# Patient Record
Sex: MALE | Race: WHITE | Employment: OTHER | RURAL
[De-identification: names, ages, dates, MRNs, and addresses within clinical notes are randomized per-mention and may not be internally consistent; named-entity substitution may affect disease eponyms.]

---

## 2017-03-28 RX ORDER — VARDENAFIL HYDROCHLORIDE 20 MG/1
20 TABLET ORAL PRN
Qty: 20 TABLET | Refills: 3 | Status: SHIPPED | OUTPATIENT
Start: 2017-03-28 | End: 2017-04-04 | Stop reason: SDUPTHER

## 2017-03-28 RX ORDER — VARDENAFIL HYDROCHLORIDE 20 MG/1
20 TABLET ORAL PRN
Qty: 20 TABLET | Refills: 3 | Status: SHIPPED | OUTPATIENT
Start: 2017-03-28 | End: 2017-03-28 | Stop reason: SDUPTHER

## 2017-04-04 RX ORDER — VARDENAFIL HYDROCHLORIDE 20 MG/1
20 TABLET ORAL PRN
Qty: 20 TABLET | Refills: 3 | Status: SHIPPED | OUTPATIENT
Start: 2017-04-04 | End: 2018-04-04

## 2017-05-01 ENCOUNTER — OFFICE VISIT (OUTPATIENT)
Dept: FAMILY MEDICINE CLINIC | Age: 72
End: 2017-05-01
Payer: MEDICARE

## 2017-05-01 ENCOUNTER — TELEPHONE (OUTPATIENT)
Dept: FAMILY MEDICINE CLINIC | Age: 72
End: 2017-05-01

## 2017-05-01 VITALS
DIASTOLIC BLOOD PRESSURE: 94 MMHG | HEIGHT: 71 IN | WEIGHT: 203 LBS | BODY MASS INDEX: 28.42 KG/M2 | SYSTOLIC BLOOD PRESSURE: 164 MMHG

## 2017-05-01 DIAGNOSIS — S93.401A SPRAIN OF RIGHT ANKLE, UNSPECIFIED LIGAMENT, INITIAL ENCOUNTER: Primary | ICD-10-CM

## 2017-05-01 PROCEDURE — 99213 OFFICE O/P EST LOW 20 MIN: CPT | Performed by: NURSE PRACTITIONER

## 2017-05-01 RX ORDER — HYDROCODONE BITARTRATE AND ACETAMINOPHEN 7.5; 325 MG/1; MG/1
1 TABLET ORAL EVERY 8 HOURS PRN
Qty: 30 TABLET | Refills: 0 | Status: SHIPPED | OUTPATIENT
Start: 2017-05-01 | End: 2017-05-05

## 2017-05-05 ENCOUNTER — TELEPHONE (OUTPATIENT)
Dept: FAMILY MEDICINE CLINIC | Age: 72
End: 2017-05-05

## 2017-05-05 DIAGNOSIS — S93.401D SPRAIN OF RIGHT ANKLE, UNSPECIFIED LIGAMENT, SUBSEQUENT ENCOUNTER: Primary | ICD-10-CM

## 2017-05-05 RX ORDER — HYDROCODONE BITARTRATE AND ACETAMINOPHEN 10; 325 MG/1; MG/1
1 TABLET ORAL EVERY 6 HOURS PRN
Qty: 60 TABLET | Refills: 0 | Status: SHIPPED | OUTPATIENT
Start: 2017-05-05 | End: 2017-05-23

## 2017-05-08 DIAGNOSIS — S93.401D SPRAIN OF RIGHT ANKLE, UNSPECIFIED LIGAMENT, SUBSEQUENT ENCOUNTER: ICD-10-CM

## 2017-05-12 ENCOUNTER — TRANSCRIBE ORDERS (OUTPATIENT)
Dept: ADMINISTRATIVE | Facility: HOSPITAL | Age: 72
End: 2017-05-12

## 2017-05-12 ENCOUNTER — APPOINTMENT (OUTPATIENT)
Dept: LAB | Facility: HOSPITAL | Age: 72
End: 2017-05-12

## 2017-05-12 DIAGNOSIS — M79.661 PAIN IN RIGHT LOWER LEG: ICD-10-CM

## 2017-05-12 DIAGNOSIS — M79.671 PAIN IN RIGHT FOOT: ICD-10-CM

## 2017-05-12 DIAGNOSIS — S93.411A SPRAIN OF CALCANEOFIBULAR LIGAMENT OF RIGHT ANKLE, INITIAL ENCOUNTER: ICD-10-CM

## 2017-05-12 DIAGNOSIS — M77.9: Primary | ICD-10-CM

## 2017-05-12 LAB
ALBUMIN SERPL-MCNC: 4.1 G/DL (ref 3.5–5)
ALBUMIN/GLOB SERPL: 1.1 G/DL (ref 1.1–2.5)
ALP SERPL-CCNC: 110 U/L (ref 24–120)
ALT SERPL W P-5'-P-CCNC: 25 U/L (ref 0–54)
ANION GAP SERPL CALCULATED.3IONS-SCNC: 13 MMOL/L (ref 4–13)
AST SERPL-CCNC: 27 U/L (ref 7–45)
BASOPHILS # BLD AUTO: 0.06 10*3/MM3 (ref 0–0.2)
BASOPHILS NFR BLD AUTO: 1.2 % (ref 0–2)
BILIRUB SERPL-MCNC: 0.4 MG/DL (ref 0.1–1)
BUN BLD-MCNC: 18 MG/DL (ref 5–21)
BUN/CREAT SERPL: 15.4 (ref 7–25)
CALCIUM SPEC-SCNC: 9.7 MG/DL (ref 8.4–10.4)
CHLORIDE SERPL-SCNC: 102 MMOL/L (ref 98–110)
CO2 SERPL-SCNC: 26 MMOL/L (ref 24–31)
CREAT BLD-MCNC: 1.17 MG/DL (ref 0.5–1.4)
CRP SERPL-MCNC: 2.34 MG/DL (ref 0–0.99)
DEPRECATED RDW RBC AUTO: 40.5 FL (ref 40–54)
EOSINOPHIL # BLD AUTO: 0.04 10*3/MM3 (ref 0–0.7)
EOSINOPHIL NFR BLD AUTO: 0.8 % (ref 0–4)
ERYTHROCYTE [DISTWIDTH] IN BLOOD BY AUTOMATED COUNT: 12.4 % (ref 12–15)
ERYTHROCYTE [SEDIMENTATION RATE] IN BLOOD: 24 MM/HR (ref 0–15)
GFR SERPL CREATININE-BSD FRML MDRD: 62 ML/MIN/1.73
GLOBULIN UR ELPH-MCNC: 3.9 GM/DL
GLUCOSE BLD-MCNC: 100 MG/DL (ref 70–100)
HCT VFR BLD AUTO: 37.7 % (ref 40–52)
HGB BLD-MCNC: 13.3 G/DL (ref 14–18)
IMM GRANULOCYTES # BLD: 0.02 10*3/MM3 (ref 0–0.03)
IMM GRANULOCYTES NFR BLD: 0.4 % (ref 0–5)
LYMPHOCYTES # BLD AUTO: 1.03 10*3/MM3 (ref 0.72–4.86)
LYMPHOCYTES NFR BLD AUTO: 20.4 % (ref 15–45)
MCH RBC QN AUTO: 31.7 PG (ref 28–32)
MCHC RBC AUTO-ENTMCNC: 35.3 G/DL (ref 33–36)
MCV RBC AUTO: 90 FL (ref 82–95)
MONOCYTES # BLD AUTO: 0.96 10*3/MM3 (ref 0.19–1.3)
MONOCYTES NFR BLD AUTO: 19 % (ref 4–12)
NEUTROPHILS # BLD AUTO: 2.93 10*3/MM3 (ref 1.87–8.4)
NEUTROPHILS NFR BLD AUTO: 58.2 % (ref 39–78)
PLATELET # BLD AUTO: 305 10*3/MM3 (ref 130–400)
PMV BLD AUTO: 10.6 FL (ref 6–12)
POTASSIUM BLD-SCNC: 3.9 MMOL/L (ref 3.5–5.3)
PROT SERPL-MCNC: 8 G/DL (ref 6.3–8.7)
RBC # BLD AUTO: 4.19 10*6/MM3 (ref 4.8–5.9)
SODIUM BLD-SCNC: 141 MMOL/L (ref 135–145)
URATE SERPL-MCNC: 8.1 MG/DL (ref 3.5–8.5)
WBC NRBC COR # BLD: 5.04 10*3/MM3 (ref 4.8–10.8)

## 2017-05-12 PROCEDURE — 36415 COLL VENOUS BLD VENIPUNCTURE: CPT | Performed by: NURSE PRACTITIONER

## 2017-05-12 PROCEDURE — 84550 ASSAY OF BLOOD/URIC ACID: CPT | Performed by: NURSE PRACTITIONER

## 2017-05-12 PROCEDURE — 80053 COMPREHEN METABOLIC PANEL: CPT | Performed by: NURSE PRACTITIONER

## 2017-05-12 PROCEDURE — 85651 RBC SED RATE NONAUTOMATED: CPT | Performed by: NURSE PRACTITIONER

## 2017-05-12 PROCEDURE — 86140 C-REACTIVE PROTEIN: CPT | Performed by: NURSE PRACTITIONER

## 2017-05-12 PROCEDURE — 85025 COMPLETE CBC W/AUTO DIFF WBC: CPT | Performed by: NURSE PRACTITIONER

## 2017-05-23 ENCOUNTER — OFFICE VISIT (OUTPATIENT)
Dept: FAMILY MEDICINE CLINIC | Age: 72
End: 2017-05-23
Payer: MEDICARE

## 2017-05-23 VITALS
HEIGHT: 71 IN | WEIGHT: 200 LBS | SYSTOLIC BLOOD PRESSURE: 132 MMHG | DIASTOLIC BLOOD PRESSURE: 84 MMHG | BODY MASS INDEX: 28 KG/M2

## 2017-05-23 DIAGNOSIS — Z23 NEED FOR PNEUMOCOCCAL VACCINATION: ICD-10-CM

## 2017-05-23 DIAGNOSIS — Z00.00 ROUTINE GENERAL MEDICAL EXAMINATION AT A HEALTH CARE FACILITY: Primary | ICD-10-CM

## 2017-05-23 DIAGNOSIS — R73.9 ELEVATED BLOOD SUGAR: ICD-10-CM

## 2017-05-23 DIAGNOSIS — N52.9 ERECTILE DYSFUNCTION, UNSPECIFIED ERECTILE DYSFUNCTION TYPE: ICD-10-CM

## 2017-05-23 DIAGNOSIS — F41.9 ANXIETY: ICD-10-CM

## 2017-05-23 DIAGNOSIS — Z12.5 SCREENING PSA (PROSTATE SPECIFIC ANTIGEN): ICD-10-CM

## 2017-05-23 DIAGNOSIS — E78.00 ELEVATED CHOLESTEROL: ICD-10-CM

## 2017-05-23 LAB
ALBUMIN SERPL-MCNC: 4.4 G/DL (ref 3.5–5.2)
ALP BLD-CCNC: 75 U/L (ref 40–130)
ALT SERPL-CCNC: 27 U/L (ref 5–41)
ANION GAP SERPL CALCULATED.3IONS-SCNC: 16 MMOL/L (ref 7–19)
AST SERPL-CCNC: 18 U/L (ref 5–40)
BILIRUB SERPL-MCNC: 0.3 MG/DL (ref 0.2–1.2)
BUN BLDV-MCNC: 15 MG/DL (ref 8–23)
CALCIUM SERPL-MCNC: 9.6 MG/DL (ref 8.8–10.2)
CHLORIDE BLD-SCNC: 104 MMOL/L (ref 98–111)
CHOLESTEROL, TOTAL: 226 MG/DL (ref 160–199)
CO2: 24 MMOL/L (ref 22–29)
CREAT SERPL-MCNC: 1 MG/DL (ref 0.5–1.2)
GFR NON-AFRICAN AMERICAN: >60
GLUCOSE BLD-MCNC: 102 MG/DL (ref 74–109)
HBA1C MFR BLD: 6.4 %
HCT VFR BLD CALC: 43 % (ref 42–52)
HDLC SERPL-MCNC: 56 MG/DL (ref 55–121)
HEMOGLOBIN: 13.9 G/DL (ref 14–18)
LDL CHOLESTEROL CALCULATED: 154 MG/DL
MCH RBC QN AUTO: 31 PG (ref 27–31)
MCHC RBC AUTO-ENTMCNC: 32.3 G/DL (ref 33–37)
MCV RBC AUTO: 96 FL (ref 80–94)
PDW BLD-RTO: 12.9 % (ref 11.5–14.5)
PLATELET # BLD: 238 K/UL (ref 130–400)
PMV BLD AUTO: 11.3 FL (ref 7.4–10.4)
POTASSIUM SERPL-SCNC: 5.1 MMOL/L (ref 3.5–5)
PROSTATE SPECIFIC ANTIGEN: 1.61 NG/ML (ref 0–4)
RBC # BLD: 4.48 M/UL (ref 4.7–6.1)
SODIUM BLD-SCNC: 144 MMOL/L (ref 136–145)
TOTAL PROTEIN: 7.6 G/DL (ref 6.6–8.7)
TRIGL SERPL-MCNC: 81 MG/DL (ref 150–199)
WBC # BLD: 9.9 K/UL (ref 4.8–10.8)

## 2017-05-23 PROCEDURE — 36415 COLL VENOUS BLD VENIPUNCTURE: CPT | Performed by: NURSE PRACTITIONER

## 2017-05-23 PROCEDURE — G0438 PPPS, INITIAL VISIT: HCPCS | Performed by: NURSE PRACTITIONER

## 2017-05-23 PROCEDURE — G0009 ADMIN PNEUMOCOCCAL VACCINE: HCPCS | Performed by: NURSE PRACTITIONER

## 2017-05-23 PROCEDURE — 99214 OFFICE O/P EST MOD 30 MIN: CPT | Performed by: NURSE PRACTITIONER

## 2017-05-23 PROCEDURE — 90732 PPSV23 VACC 2 YRS+ SUBQ/IM: CPT | Performed by: NURSE PRACTITIONER

## 2017-05-23 RX ORDER — ALPRAZOLAM 0.5 MG/1
0.5 TABLET ORAL 3 TIMES DAILY PRN
Qty: 60 TABLET | Refills: 0 | Status: SHIPPED | OUTPATIENT
Start: 2017-05-23 | End: 2017-07-12 | Stop reason: SDUPTHER

## 2017-05-23 ASSESSMENT — PATIENT HEALTH QUESTIONNAIRE - PHQ9: SUM OF ALL RESPONSES TO PHQ QUESTIONS 1-9: 0

## 2017-05-23 ASSESSMENT — ANXIETY QUESTIONNAIRES: GAD7 TOTAL SCORE: 0

## 2017-05-23 ASSESSMENT — LIFESTYLE VARIABLES
AUDIT-C TOTAL SCORE: 5
AUDIT TOTAL SCORE: 5
HOW OFTEN DURING THE LAST YEAR HAVE YOU FAILED TO DO WHAT WAS NORMALLY EXPECTED FROM YOU BECAUSE OF DRINKING: 0
HAS A RELATIVE, FRIEND, DOCTOR, OR ANOTHER HEALTH PROFESSIONAL EXPRESSED CONCERN ABOUT YOUR DRINKING OR SUGGESTED YOU CUT DOWN: 0
HOW OFTEN DURING THE LAST YEAR HAVE YOU HAD A FEELING OF GUILT OR REMORSE AFTER DRINKING: 0
HAVE YOU OR SOMEONE ELSE BEEN INJURED AS A RESULT OF YOUR DRINKING: 0
HOW MANY STANDARD DRINKS CONTAINING ALCOHOL DO YOU HAVE ON A TYPICAL DAY: 0
HOW OFTEN DO YOU HAVE A DRINK CONTAINING ALCOHOL: 3
HOW OFTEN DURING THE LAST YEAR HAVE YOU BEEN UNABLE TO REMEMBER WHAT HAPPENED THE NIGHT BEFORE BECAUSE YOU HAD BEEN DRINKING: 0
HOW OFTEN DURING THE LAST YEAR HAVE YOU FOUND THAT YOU WERE NOT ABLE TO STOP DRINKING ONCE YOU HAD STARTED: 0
HOW OFTEN DURING THE LAST YEAR HAVE YOU NEEDED AN ALCOHOLIC DRINK FIRST THING IN THE MORNING TO GET YOURSELF GOING AFTER A NIGHT OF HEAVY DRINKING: 0
HOW OFTEN DO YOU HAVE SIX OR MORE DRINKS ON ONE OCCASION: 2

## 2017-05-23 ASSESSMENT — ENCOUNTER SYMPTOMS
NAUSEA: 0
GASTROINTESTINAL NEGATIVE: 1
ABDOMINAL PAIN: 0
BACK PAIN: 0
COLOR CHANGE: 0
RESPIRATORY NEGATIVE: 1
RHINORRHEA: 0
SHORTNESS OF BREATH: 0
DIARRHEA: 0

## 2017-07-12 ENCOUNTER — TELEPHONE (OUTPATIENT)
Dept: FAMILY MEDICINE CLINIC | Age: 72
End: 2017-07-12

## 2017-07-12 DIAGNOSIS — F41.9 ANXIETY: ICD-10-CM

## 2017-07-12 RX ORDER — ALPRAZOLAM 0.5 MG/1
0.5 TABLET ORAL 3 TIMES DAILY PRN
Qty: 60 TABLET | Refills: 0 | Status: SHIPPED | OUTPATIENT
Start: 2017-07-12

## 2017-08-08 ENCOUNTER — TELEPHONE (OUTPATIENT)
Dept: FAMILY MEDICINE CLINIC | Age: 72
End: 2017-08-08

## 2017-08-08 RX ORDER — DICLOFENAC SODIUM 75 MG/1
75 TABLET, DELAYED RELEASE ORAL 2 TIMES DAILY
Qty: 60 TABLET | Refills: 5 | Status: SHIPPED | OUTPATIENT
Start: 2017-08-08

## 2017-08-08 RX ORDER — ALLOPURINOL 300 MG/1
300 TABLET ORAL DAILY
Qty: 30 TABLET | Refills: 5 | Status: SHIPPED | OUTPATIENT
Start: 2017-08-08

## 2023-04-18 ENCOUNTER — HOSPITAL ENCOUNTER (OUTPATIENT)
Dept: WOUND CARE | Age: 78
Discharge: HOME | End: 2023-04-18
Payer: MEDICARE

## 2023-04-18 VITALS
RESPIRATION RATE: 20 BRPM | TEMPERATURE: 97.5 F | SYSTOLIC BLOOD PRESSURE: 195 MMHG | HEART RATE: 69 BPM | DIASTOLIC BLOOD PRESSURE: 84 MMHG | HEIGHT: 71 IN | BODY MASS INDEX: 27.3 KG/M2 | WEIGHT: 195 LBS

## 2023-04-18 DIAGNOSIS — L97.922 SKIN ULCER OF LEFT LOWER LEG WITH FAT LAYER EXPOSED (HCC): Primary | ICD-10-CM

## 2023-04-18 DIAGNOSIS — I73.9 PVD (PERIPHERAL VASCULAR DISEASE) (HCC): ICD-10-CM

## 2023-04-18 PROCEDURE — 97598 DBRDMT OPN WND ADDL 20CM/<: CPT | Performed by: NURSE PRACTITIONER

## 2023-04-18 PROCEDURE — 97598 DBRDMT OPN WND ADDL 20CM/<: CPT

## 2023-04-18 PROCEDURE — 99205 OFFICE O/P NEW HI 60 MIN: CPT | Performed by: NURSE PRACTITIONER

## 2023-04-18 PROCEDURE — 97597 DBRDMT OPN WND 1ST 20 CM/<: CPT | Performed by: NURSE PRACTITIONER

## 2023-04-18 PROCEDURE — 99204 OFFICE O/P NEW MOD 45 MIN: CPT

## 2023-04-18 PROCEDURE — 97597 DBRDMT OPN WND 1ST 20 CM/<: CPT

## 2023-04-18 RX ORDER — BACITRACIN, NEOMYCIN, POLYMYXIN B 400; 3.5; 5 [USP'U]/G; MG/G; [USP'U]/G
OINTMENT TOPICAL ONCE
OUTPATIENT
Start: 2023-04-18 | End: 2023-04-18

## 2023-04-18 RX ORDER — LIDOCAINE HYDROCHLORIDE 20 MG/ML
JELLY TOPICAL ONCE
Status: DISCONTINUED | OUTPATIENT
Start: 2023-04-18 | End: 2023-04-20 | Stop reason: HOSPADM

## 2023-04-18 RX ORDER — LIDOCAINE 50 MG/G
OINTMENT TOPICAL ONCE
OUTPATIENT
Start: 2023-04-18 | End: 2023-04-18

## 2023-04-18 RX ORDER — BETAMETHASONE DIPROPIONATE 0.05 %
OINTMENT (GRAM) TOPICAL ONCE
OUTPATIENT
Start: 2023-04-18 | End: 2023-04-18

## 2023-04-18 RX ORDER — CLOBETASOL PROPIONATE 0.5 MG/G
OINTMENT TOPICAL ONCE
OUTPATIENT
Start: 2023-04-18 | End: 2023-04-18

## 2023-04-18 RX ORDER — CETIRIZINE HYDROCHLORIDE 10 MG/1
10 TABLET ORAL DAILY
COMMUNITY

## 2023-04-18 RX ORDER — BACITRACIN ZINC AND POLYMYXIN B SULFATE 500; 1000 [USP'U]/G; [USP'U]/G
OINTMENT TOPICAL ONCE
OUTPATIENT
Start: 2023-04-18 | End: 2023-04-18

## 2023-04-18 RX ORDER — LISINOPRIL 10 MG/1
10 TABLET ORAL DAILY
COMMUNITY

## 2023-04-18 RX ORDER — LIDOCAINE HYDROCHLORIDE 40 MG/ML
SOLUTION TOPICAL ONCE
OUTPATIENT
Start: 2023-04-18 | End: 2023-04-18

## 2023-04-18 RX ORDER — LIDOCAINE 40 MG/G
CREAM TOPICAL ONCE
OUTPATIENT
Start: 2023-04-18 | End: 2023-04-18

## 2023-04-18 RX ORDER — GINSENG 100 MG
CAPSULE ORAL ONCE
OUTPATIENT
Start: 2023-04-18 | End: 2023-04-18

## 2023-04-18 RX ORDER — LIDOCAINE HYDROCHLORIDE 20 MG/ML
JELLY TOPICAL ONCE
OUTPATIENT
Start: 2023-04-18 | End: 2023-04-18

## 2023-04-18 RX ORDER — GENTAMICIN SULFATE 1 MG/G
OINTMENT TOPICAL ONCE
OUTPATIENT
Start: 2023-04-18 | End: 2023-04-18

## 2023-04-18 ASSESSMENT — VISUAL ACUITY: OU: 1

## 2023-04-18 NOTE — PROGRESS NOTES
Laterality Date    COLONOSCOPY  01/01/2004    int. hemorrhoids,     HIP ARTHROPLASTY Right     VASCULAR SURGERY      left leg     History reviewed. No pertinent family history. Social History     Tobacco Use    Smoking status: Never    Smokeless tobacco: Never   Substance Use Topics    Alcohol use: Yes         Review of Systems    Review of Systems   Skin:  Positive for wound. All other systems reviewed and are negative. All other review of systems are negative. Physical Exam    BP (!) 195/84   Pulse 69   Temp 97.5 °F (36.4 °C) (Temporal)   Resp 20   Ht 5' 11\" (1.803 m)   Wt 195 lb (88.5 kg)   BMI 27.20 kg/m²     Physical Exam  Vitals reviewed. Constitutional:       Appearance: Normal appearance. He is normal weight. HENT:      Head: Normocephalic and atraumatic. Right Ear: External ear normal.      Left Ear: External ear normal.   Eyes:      General: Lids are normal. Lids are everted, no foreign bodies appreciated. Vision grossly intact. Gaze aligned appropriately. Cardiovascular:      Rate and Rhythm: Normal rate and regular rhythm. Pulses: Normal pulses. Heart sounds: Normal heart sounds. Pulmonary:      Effort: Pulmonary effort is normal.      Breath sounds: Normal breath sounds. Abdominal:      General: Bowel sounds are normal.   Musculoskeletal:         General: Normal range of motion. Skin:     General: Skin is warm and dry. Capillary Refill: Capillary refill takes 2 to 3 seconds. Findings: Wound present. Neurological:      Mental Status: He is alert and oriented to person, place, and time. Psychiatric:         Mood and Affect: Mood normal.         Behavior: Behavior normal.         Thought Content: Thought content normal.         Judgment: Judgment normal.           Post Debridement Measurements and Assessment:    The patientspain is   . Wound is has improved.     Please refer to nursing measurements and assessment regarding wound pre and

## 2023-04-18 NOTE — PLAN OF CARE
[place] Application   Below Knee    NAME:  [facility] OF BIRTH:  1945  MEDICAL RECORD NUMBER:  [id number]  DATE:  4/18/2023    Tianna zambranoot: Applied moisturizing agent to dry skin as needed. Appied primary and secondary dressing as ordered. Applied Unna roll from toes to knee overlapping each time. Applied ace wrap or coban from toes to below the knee. Instructed patient/caregiver to keep dressing dry and intact. DO NOT REMOVE DRESSING. Instructed pt/family/caregiver to report excessive draining, loose bandage, wet dressing, severe pain or tingling in toes. Applied [place] dressing below the knee to left lower leg. Unna Boot(s) were applied per  Guidelines.      Electronically signed by Nils Majano RN on 4/18/2023 at 11:04 AM

## 2023-04-18 NOTE — DISCHARGE INSTRUCTIONS
[address] [facility]   Physician Orders and Discharge Instructions  [address]  Telephone: [phone]    NAME:  Jillian Burden OF BIRTH:  1945  MEDICAL RECORD NUMBER:  [id number]  DATE:  4/18/2023    Discharge condition: Stable    Discharge to: Home    Left via:Private automobile    Accompanied by:  self    ECF/HHA: none    Dressing Orders:  Left leg Wound:   Wash with soap and water. Apply restore AG to wound bed then apply Calamine Coflex Unnaboot from toes to knee. Change once weekly . Compression Therapy is an important part of your program of care. Compression makes it easier for your body to pump the blood from your legs back to your heart, helping alleviate a condition called chronic venous insufficiency. Chronic venous insufficiency is an underlying cause of your injury, and managing it properly will help you to heal.      Keep the bandage in place at all times unless you experience foot pain or numbness or coolness of the toes. Do not attempt to remove and reapply the bandage system. Give it time. Your bandage may feel tight at first. This is normal. Your bandage will become more comfortable as swelling goes down. Be active, as suggested by your healthcare provider. Daily walks or mild exercise encourages better blood flow to aid healing. Put your feet up when sitting for long periods of time. Keep the bandage dry. Wet compression bandages are more likely to slip down and/or cause damage to good skin. PRECAUTIONS For your safety, compression therapy should be used under the supervision of a healthcare professional. Contact your care provider if you experience any of the following:  Pain that does not go away with elevation. Discoloration or numbness of the toes. The bandage slipping out of place  Fluid leaking through the bandage.     Treatment Orders:  Protein rich diet

## 2023-04-21 ENCOUNTER — HOSPITAL ENCOUNTER (OUTPATIENT)
Dept: WOUND CARE | Age: 78
Discharge: HOME | End: 2023-04-21
Payer: MEDICARE

## 2023-04-21 PROCEDURE — 29580 STRAPPING UNNA BOOT: CPT

## 2023-04-21 NOTE — PLAN OF CARE
Problem: Discharge Planning  Goal: Discharge to home or other facility with appropriate resources  Outcome: Progressing     Problem: Wound:  Goal: Will show signs of wound healing; wound closure and no evidence of infection  Description: Will show signs of wound healing; wound closure and no evidence of infection  Outcome: Progressing     Problem: Venous:  Goal: Signs of wound healing will improve  Description: Signs of wound healing will improve  Outcome: Progressing     Problem: Compression therapy:  Goal: Will be free from complications associated with compression therapy  Description: Will be free from complications associated with compression therapy  Outcome: Progressing

## 2023-04-21 NOTE — DISCHARGE INSTRUCTIONS
Orders:  Protein rich diet (unless restricted by your physician); Multivitamin daily; Elevate legs above the level of your heart when sitting 3-4 times daily for at least one hour each time, avoid standing for long periods of time. [facility] follow up visit ___Monday__________________________  (Please note your next appointment above and if you are unable to keep, kindly give a 24 hour notice. Thank you.)          If you experience any of the following, please call the "[employer] during business hours:    * Increase in Pain  * Temperature over 101  * Increase in drainage from your wound  * Drainage with a foul odor  * Bleeding  * Increase in swelling  * Need for compression bandage changes due to slippage, breakthrough drainage. If you need medical attention outside of the business hours of the "[employer] please contact your PCP or go to the nearest emergency room.

## 2023-04-21 NOTE — PLAN OF CARE
[place] Application   Below Knee    NAME:  Puja [facility] OF BIRTH:  1945  MEDICAL RECORD NUMBER:  [id number]  DATE:  4/21/2023    Morgan Greene boot: Applied moisturizing agent to dry skin as needed. Appied primary and secondary dressing as ordered. Applied Unna roll from toes to knee overlapping each time. Applied ace wrap or coban from toes to below the knee. Instructed patient/caregiver to keep dressing dry and intact. DO NOT REMOVE DRESSING. Instructed pt/family/caregiver to report excessive draining, loose bandage, wet dressing, severe pain or tingling in toes. Applied [place] dressing below the knee to left lower leg. Unna Boot(s) were applied per  Guidelines.      Electronically signed by Brett Peguero RN on 4/21/2023 at 11:43 AM

## 2023-04-24 ENCOUNTER — HOSPITAL ENCOUNTER (OUTPATIENT)
Dept: WOUND CARE | Age: 78
Discharge: HOME | End: 2023-04-24
Payer: MEDICARE

## 2023-04-24 VITALS
BODY MASS INDEX: 27.3 KG/M2 | WEIGHT: 195 LBS | HEART RATE: 69 BPM | HEIGHT: 71 IN | RESPIRATION RATE: 20 BRPM | DIASTOLIC BLOOD PRESSURE: 84 MMHG | TEMPERATURE: 97.5 F | SYSTOLIC BLOOD PRESSURE: 150 MMHG

## 2023-04-24 DIAGNOSIS — I73.9 PVD (PERIPHERAL VASCULAR DISEASE) (HCC): ICD-10-CM

## 2023-04-24 DIAGNOSIS — L97.922 SKIN ULCER OF LEFT LOWER LEG WITH FAT LAYER EXPOSED (HCC): Primary | ICD-10-CM

## 2023-04-24 PROCEDURE — 99212 OFFICE O/P EST SF 10 MIN: CPT | Performed by: NURSE PRACTITIONER

## 2023-04-24 PROCEDURE — 29580 STRAPPING UNNA BOOT: CPT

## 2023-04-24 NOTE — PROGRESS NOTES
[address]   Progress Note and Procedure Note      [address] [place] RECORD NUMBER:  [id number]  AGE: 69 y.o. GENDER: male  : 1945  EPISODE DATE:  2023    Subjective:     Chief Complaint   Patient presents with    Wound Check     Left leg wound         HISTORY of PRESENT ILLNESS HPI     Yogesh Fernandez is a 69 y.o. male who presents today for wound/ulcer evaluation. History of Wound Context: left leg wounds follow up/eval and treat    Ulcer Identification:  Ulcer Type: venous  Contributing Factors: edema    Wound: N/A        PAST MEDICAL HISTORY        Diagnosis Date    Hepatitis A     Hyperlipidemia     mild    Hypertension     Panic attacks        PAST SURGICAL HISTORY    Past Surgical History:   Procedure Laterality Date    COLONOSCOPY  2004    int. hemorrhoids,     HIP ARTHROPLASTY Right     VASCULAR SURGERY      left leg       FAMILY HISTORY    History reviewed. No pertinent family history.     SOCIAL HISTORY    Social History     Tobacco Use    Smoking status: Never    Smokeless tobacco: Never   Substance Use Topics    Alcohol use: Yes    Drug use: No       ALLERGIES    Allergies   Allergen Reactions    Norco [Hydrocodone-Acetaminophen] Other (See Comments)     insomnia    Tetracyclines & Related        MEDICATIONS    Current Outpatient Medications on File Prior to Encounter   Medication Sig Dispense Refill    cetirizine (ZYRTEC) 10 MG tablet Take 1 tablet by mouth daily      lisinopril (PRINIVIL;ZESTRIL) 10 MG tablet Take 1 tablet by mouth daily      allopurinol (ZYLOPRIM) 300 MG tablet Take 1 tablet by mouth daily 30 tablet 5    diclofenac (VOLTAREN) 75 MG EC tablet Take 1 tablet by mouth 2 times daily 60 tablet 5    ALPRAZolam (XANAX) 0.5 MG tablet Take 1 tablet by mouth 3 times daily as needed for Anxiety 60 tablet 0    vardenafil (LEVITRA) 20 MG tablet Take 1 tablet by mouth as needed for Erectile Dysfunction (take 30mins to one hour before sex) 21

## 2023-04-24 NOTE — PLAN OF CARE
[place] Application   Below Knee    NAME:  Puja [facility] OF BIRTH:  1945  MEDICAL RECORD NUMBER:  [id number]  DATE:  4/24/2023    Ogema Gum boot: Applied moisturizing agent to dry skin as needed. Appied primary and secondary dressing as ordered. Applied Unna roll from toes to knee overlapping each time. Applied ace wrap or coban from toes to below the knee. Instructed patient/caregiver to keep dressing dry and intact. DO NOT REMOVE DRESSING. Instructed pt/family/caregiver to report excessive draining, loose bandage, wet dressing, severe pain or tingling in toes. Applied [place] dressing below the knee to left lower leg. Unna Boot(s) were applied per  Guidelines.      Electronically signed by Chilango Lam RN on 4/24/2023 at 9:25 AM

## 2023-04-27 ENCOUNTER — HOSPITAL ENCOUNTER (OUTPATIENT)
Dept: WOUND CARE | Age: 78
Discharge: HOME | End: 2023-04-27
Payer: MEDICARE

## 2023-04-27 VITALS
DIASTOLIC BLOOD PRESSURE: 81 MMHG | SYSTOLIC BLOOD PRESSURE: 158 MMHG | TEMPERATURE: 98 F | RESPIRATION RATE: 18 BRPM | HEART RATE: 66 BPM

## 2023-04-27 DIAGNOSIS — I73.9 PVD (PERIPHERAL VASCULAR DISEASE) (HCC): ICD-10-CM

## 2023-04-27 DIAGNOSIS — L97.922 SKIN ULCER OF LEFT LOWER LEG WITH FAT LAYER EXPOSED (HCC): Primary | ICD-10-CM

## 2023-04-27 PROCEDURE — 29580 STRAPPING UNNA BOOT: CPT

## 2023-04-27 RX ORDER — BETAMETHASONE DIPROPIONATE 0.05 %
OINTMENT (GRAM) TOPICAL ONCE
OUTPATIENT
Start: 2023-04-27 | End: 2023-04-27

## 2023-04-27 RX ORDER — BACITRACIN, NEOMYCIN, POLYMYXIN B 400; 3.5; 5 [USP'U]/G; MG/G; [USP'U]/G
OINTMENT TOPICAL ONCE
OUTPATIENT
Start: 2023-04-27 | End: 2023-04-27

## 2023-04-27 RX ORDER — LIDOCAINE HYDROCHLORIDE 20 MG/ML
JELLY TOPICAL ONCE
OUTPATIENT
Start: 2023-04-27 | End: 2023-04-27

## 2023-04-27 RX ORDER — GENTAMICIN SULFATE 1 MG/G
OINTMENT TOPICAL ONCE
OUTPATIENT
Start: 2023-04-27 | End: 2023-04-27

## 2023-04-27 RX ORDER — LIDOCAINE HYDROCHLORIDE 40 MG/ML
SOLUTION TOPICAL ONCE
OUTPATIENT
Start: 2023-04-27 | End: 2023-04-27

## 2023-04-27 RX ORDER — LIDOCAINE 50 MG/G
OINTMENT TOPICAL ONCE
OUTPATIENT
Start: 2023-04-27 | End: 2023-04-27

## 2023-04-27 RX ORDER — LIDOCAINE 40 MG/G
CREAM TOPICAL ONCE
OUTPATIENT
Start: 2023-04-27 | End: 2023-04-27

## 2023-04-27 RX ORDER — CLOBETASOL PROPIONATE 0.5 MG/G
OINTMENT TOPICAL ONCE
OUTPATIENT
Start: 2023-04-27 | End: 2023-04-27

## 2023-04-27 RX ORDER — GINSENG 100 MG
CAPSULE ORAL ONCE
OUTPATIENT
Start: 2023-04-27 | End: 2023-04-27

## 2023-04-27 RX ORDER — BACITRACIN ZINC AND POLYMYXIN B SULFATE 500; 1000 [USP'U]/G; [USP'U]/G
OINTMENT TOPICAL ONCE
OUTPATIENT
Start: 2023-04-27 | End: 2023-04-27

## 2023-04-27 NOTE — DISCHARGE INSTRUCTIONS
Orders:  Protein rich diet (unless restricted by your physician); Multivitamin daily; Elevate legs above the level of your heart when sitting 3-4 times daily for at least one hour each time, avoid standing for long periods of time. [address] [facility] follow up visit ___1 week with Kasia__________________________  (Please note your next appointment above and if you are unable to keep, kindly give a 24 hour notice. Thank you.)          If you experience any of the following, please call the [facility] during business hours:    * Increase in Pain  * Temperature over 101  * Increase in drainage from your wound  * Drainage with a foul odor  * Bleeding  * Increase in swelling  * Need for compression bandage changes due to slippage, breakthrough drainage. If you need medical attention outside of the business hours of the [facility] please contact your PCP or go to the nearest emergency room.

## 2023-04-27 NOTE — PROGRESS NOTES
[place] Application   Below Knee    NAME:  Puja [facility] OF BIRTH:  1945  MEDICAL RECORD NUMBER:  [id number]  DATE:  4/27/2023    Marita Goodrich boot: Applied moisturizing agent to dry skin as needed. Appied primary and secondary dressing as ordered. Applied Unna roll from toes to knee overlapping each time. Applied ace wrap or coban from toes to below the knee. Instructed patient/caregiver to keep dressing dry and intact. DO NOT REMOVE DRESSING. Instructed pt/family/caregiver to report excessive draining, loose bandage, wet dressing, severe pain or tingling in toes. Applied [place] dressing below the knee to left lower leg. Unna Boot(s) were applied per  Guidelines.      Electronically signed by Kiah Lenz RN on 4/27/2023 at 10:57 AM

## 2023-05-01 ENCOUNTER — HOSPITAL ENCOUNTER (OUTPATIENT)
Dept: WOUND CARE | Age: 78
Discharge: HOME | End: 2023-05-01
Payer: MEDICARE

## 2023-05-01 VITALS
SYSTOLIC BLOOD PRESSURE: 158 MMHG | DIASTOLIC BLOOD PRESSURE: 81 MMHG | RESPIRATION RATE: 18 BRPM | HEART RATE: 66 BPM | TEMPERATURE: 98 F | HEIGHT: 71 IN | BODY MASS INDEX: 27.3 KG/M2 | WEIGHT: 195 LBS

## 2023-05-01 DIAGNOSIS — I73.9 PVD (PERIPHERAL VASCULAR DISEASE) (HCC): ICD-10-CM

## 2023-05-01 DIAGNOSIS — L97.922 SKIN ULCER OF LEFT LOWER LEG WITH FAT LAYER EXPOSED (HCC): Primary | ICD-10-CM

## 2023-05-01 PROCEDURE — 29580 STRAPPING UNNA BOOT: CPT

## 2023-05-01 PROCEDURE — 99212 OFFICE O/P EST SF 10 MIN: CPT | Performed by: NURSE PRACTITIONER

## 2023-05-01 NOTE — PLAN OF CARE
[place] Application   Below Knee    NAME:  [address] [facility] OF BIRTH:  1945  MEDICAL RECORD NUMBER:  [id number]  DATE:  5/1/2023    Dick Ch boot: Applied moisturizing agent to dry skin as needed. Appied primary and secondary dressing as ordered. Applied Unna roll from toes to knee overlapping each time. Applied ace wrap or coban from toes to below the knee. Instructed patient/caregiver to keep dressing dry and intact. DO NOT REMOVE DRESSING. Instructed pt/family/caregiver to report excessive draining, loose bandage, wet dressing, severe pain or tingling in toes. Applied [place] dressing below the knee to left lower leg. Unna Boot(s) were applied per  Guidelines.      Electronically signed by Bernardo Nunez RN on 5/1/2023 at 10:34 AM

## 2023-05-01 NOTE — PROGRESS NOTES
[facility] [address]   Progress Note and Procedure Note      [address] [place] RECORD NUMBER:  [id number]  AGE: 69 y.o. GENDER: male  : 1945  EPISODE DATE:  2023    Subjective:     Chief Complaint   Patient presents with    Wound Check     Left leg wound         HISTORY of PRESENT ILLNESS KATHRYN Alarcon is a 69 y.o. male who presents today for wound/ulcer evaluation. History of Wound Context: left leg wound follow up/eval and treat    Ulcer Identification:  Ulcer Type: venous  Contributing Factors: edema    Wound: N/A        PAST MEDICAL HISTORY        Diagnosis Date    Hepatitis A     Hyperlipidemia     mild    Hypertension     Panic attacks        PAST SURGICAL HISTORY    Past Surgical History:   Procedure Laterality Date    COLONOSCOPY  2004    int. hemorrhoids,     HIP ARTHROPLASTY Right     VASCULAR SURGERY      left leg       FAMILY HISTORY    History reviewed. No pertinent family history.     SOCIAL HISTORY    Social History     Tobacco Use    Smoking status: Never    Smokeless tobacco: Never   Substance Use Topics    Alcohol use: Yes    Drug use: No       ALLERGIES    Allergies   Allergen Reactions    Norco [Hydrocodone-Acetaminophen] Other (See Comments)     insomnia    Tetracyclines & Related        MEDICATIONS    Current Outpatient Medications on File Prior to Encounter   Medication Sig Dispense Refill    cetirizine (ZYRTEC) 10 MG tablet Take 1 tablet by mouth daily      lisinopril (PRINIVIL;ZESTRIL) 10 MG tablet Take 1 tablet by mouth daily      allopurinol (ZYLOPRIM) 300 MG tablet Take 1 tablet by mouth daily 30 tablet 5    diclofenac (VOLTAREN) 75 MG EC tablet Take 1 tablet by mouth 2 times daily 60 tablet 5    ALPRAZolam (XANAX) 0.5 MG tablet Take 1 tablet by mouth 3 times daily as needed for Anxiety 60 tablet 0    vardenafil (LEVITRA) 20 MG tablet Take 1 tablet by mouth as needed for Erectile Dysfunction (take 30mins to one hour before sex) 21

## 2023-05-04 ENCOUNTER — HOSPITAL ENCOUNTER (OUTPATIENT)
Dept: WOUND CARE | Age: 78
Discharge: HOME | End: 2023-05-04
Payer: MEDICARE

## 2023-05-04 VITALS
RESPIRATION RATE: 20 BRPM | DIASTOLIC BLOOD PRESSURE: 86 MMHG | WEIGHT: 195 LBS | HEART RATE: 69 BPM | BODY MASS INDEX: 27.3 KG/M2 | TEMPERATURE: 98.1 F | HEIGHT: 71 IN | SYSTOLIC BLOOD PRESSURE: 205 MMHG

## 2023-05-04 PROCEDURE — 29580 STRAPPING UNNA BOOT: CPT

## 2023-05-04 NOTE — DISCHARGE INSTRUCTIONS
[address] [facility]   Physician Orders and Discharge Instructions  [address]  Telephone: [phone]    NAME:  Ruby Austin OF BIRTH:  1945  MEDICAL RECORD NUMBER:  [id number]  DATE:  5/4/2023    Discharge condition: Stable    Discharge to: Home    Left via:Private automobile    Accompanied by:  self    ECF/HHA: none     Dressing Orders:   Left leg Wound:   Wash with soap and water. Apply hydrofera blue saline moistened to wound bed and wrap with Calamine Coflex Unnaboot from toes to knee and OPTILOCK outside of calamine layer. Change every 3 days       Compression Therapy is an important part of your program of care. Compression makes it easier for your body to pump the blood from your legs back to your heart, helping alleviate a condition called chronic venous insufficiency. Chronic venous insufficiency is an underlying cause of your injury, and managing it properly will help you to heal.       · Keep the bandage in place at all times unless you experience foot pain or numbness or coolness of the toes. Do not attempt to remove and reapply the bandage system. · Give it time. Your bandage may feel tight at first. This is normal. Your bandage will become more comfortable as swelling goes down. · Be active, as suggested by your healthcare provider. Daily walks or mild exercise encourages better blood flow to aid healing. · Put your feet up when sitting for long periods of time. · Keep the bandage dry. Wet compression bandages are more likely to slip down and/or cause damage to good skin. PRECAUTIONS For your safety, compression therapy should be used under the supervision of a healthcare professional. Contact your care provider if you experience any of the following:   · Pain that does not go away with elevation. · Discoloration or numbness of the toes.    · The bandage slipping

## 2023-05-04 NOTE — PLAN OF CARE
[place] Application   Below Knee    NAME:  Marina Mcfarland  YOB: 1945  MEDICAL RECORD NUMBER:  [id number]  DATE:  5/4/2023    Caesar Buckle boot: Applied moisturizing agent to dry skin as needed. Appied primary and secondary dressing as ordered. Applied Unna roll from toes to knee overlapping each time. Applied ace wrap or coban from toes to below the knee. Instructed patient/caregiver to keep dressing dry and intact. DO NOT REMOVE DRESSING. Instructed pt/family/caregiver to report excessive draining, loose bandage, wet dressing, severe pain or tingling in toes. Applied [place] dressing below the knee to left lower leg. Unna Boot(s) were applied per  Guidelines.      Electronically signed by Lucero Cotton RN on 5/4/2023 at 10:08 AM

## 2023-05-08 ENCOUNTER — HOSPITAL ENCOUNTER (OUTPATIENT)
Dept: WOUND CARE | Age: 78
Discharge: HOME | End: 2023-05-08
Payer: MEDICARE

## 2023-05-08 VITALS
RESPIRATION RATE: 18 BRPM | TEMPERATURE: 98 F | HEART RATE: 63 BPM | DIASTOLIC BLOOD PRESSURE: 73 MMHG | HEIGHT: 71 IN | BODY MASS INDEX: 27.3 KG/M2 | SYSTOLIC BLOOD PRESSURE: 158 MMHG | WEIGHT: 195 LBS

## 2023-05-08 DIAGNOSIS — L97.922 SKIN ULCER OF LEFT LOWER LEG WITH FAT LAYER EXPOSED (HCC): Primary | ICD-10-CM

## 2023-05-08 DIAGNOSIS — I73.9 PVD (PERIPHERAL VASCULAR DISEASE) (HCC): ICD-10-CM

## 2023-05-08 PROCEDURE — 97597 DBRDMT OPN WND 1ST 20 CM/<: CPT

## 2023-05-08 PROCEDURE — 97597 DBRDMT OPN WND 1ST 20 CM/<: CPT | Performed by: NURSE PRACTITIONER

## 2023-05-08 PROCEDURE — 6370000000 HC RX 637 (ALT 250 FOR IP): Performed by: NURSE PRACTITIONER

## 2023-05-08 RX ORDER — BACITRACIN ZINC AND POLYMYXIN B SULFATE 500; 1000 [USP'U]/G; [USP'U]/G
OINTMENT TOPICAL ONCE
OUTPATIENT
Start: 2023-05-08 | End: 2023-05-08

## 2023-05-08 RX ORDER — LIDOCAINE 50 MG/G
OINTMENT TOPICAL ONCE
OUTPATIENT
Start: 2023-05-08 | End: 2023-05-08

## 2023-05-08 RX ORDER — LIDOCAINE HYDROCHLORIDE 20 MG/ML
JELLY TOPICAL ONCE
OUTPATIENT
Start: 2023-05-08 | End: 2023-05-08

## 2023-05-08 RX ORDER — LIDOCAINE 40 MG/G
CREAM TOPICAL ONCE
OUTPATIENT
Start: 2023-05-08 | End: 2023-05-08

## 2023-05-08 RX ORDER — LIDOCAINE HYDROCHLORIDE 40 MG/ML
SOLUTION TOPICAL ONCE
OUTPATIENT
Start: 2023-05-08 | End: 2023-05-08

## 2023-05-08 RX ORDER — BACITRACIN, NEOMYCIN, POLYMYXIN B 400; 3.5; 5 [USP'U]/G; MG/G; [USP'U]/G
OINTMENT TOPICAL ONCE
OUTPATIENT
Start: 2023-05-08 | End: 2023-05-08

## 2023-05-08 RX ORDER — GINSENG 100 MG
CAPSULE ORAL ONCE
OUTPATIENT
Start: 2023-05-08 | End: 2023-05-08

## 2023-05-08 RX ORDER — LIDOCAINE HYDROCHLORIDE 20 MG/ML
JELLY TOPICAL ONCE
Status: COMPLETED | OUTPATIENT
Start: 2023-05-08 | End: 2023-05-08

## 2023-05-08 RX ORDER — BETAMETHASONE DIPROPIONATE 0.05 %
OINTMENT (GRAM) TOPICAL ONCE
OUTPATIENT
Start: 2023-05-08 | End: 2023-05-08

## 2023-05-08 RX ORDER — M-VIT,TX,IRON,MINS/CALC/FOLIC 27MG-0.4MG
1 TABLET ORAL DAILY
COMMUNITY

## 2023-05-08 RX ORDER — GENTAMICIN SULFATE 1 MG/G
OINTMENT TOPICAL ONCE
OUTPATIENT
Start: 2023-05-08 | End: 2023-05-08

## 2023-05-08 RX ORDER — CLOBETASOL PROPIONATE 0.5 MG/G
OINTMENT TOPICAL ONCE
OUTPATIENT
Start: 2023-05-08 | End: 2023-05-08

## 2023-05-08 RX ADMIN — LIDOCAINE HYDROCHLORIDE: 20 JELLY TOPICAL at 10:28

## 2023-05-08 NOTE — PROGRESS NOTES
[address]   Progress Note and Procedure Note      [address] [place] RECORD NUMBER:  [id number]  AGE: 69 y.o. GENDER: male  : 1945  EPISODE DATE:  2023    Subjective:     Chief Complaint   Patient presents with    Wound Check      HISTORY of PRESENT ILLNESS HPI     Marcie Dandy is a 69 y.o. male who presents today for wound/ulcer evaluation. History of Wound Context: left leg wound follow up/eval and treat    Ulcer Identification:  Ulcer Type: venous  Contributing Factors: venous stasis    Wound: N/A        PAST MEDICAL HISTORY        Diagnosis Date    Hepatitis A 1970    Hyperlipidemia     mild    Hypertension     Panic attacks        PAST SURGICAL HISTORY    Past Surgical History:   Procedure Laterality Date    COLONOSCOPY  2004    int. hemorrhoids,     HIP ARTHROPLASTY Right     VASCULAR SURGERY      left leg       FAMILY HISTORY    No family history on file.     SOCIAL HISTORY    Social History     Tobacco Use    Smoking status: Never    Smokeless tobacco: Never   Substance Use Topics    Alcohol use: Yes    Drug use: No       ALLERGIES    Allergies   Allergen Reactions    Norco [Hydrocodone-Acetaminophen] Other (See Comments)     insomnia    Tetracyclines & Related        MEDICATIONS    Current Outpatient Medications on File Prior to Encounter   Medication Sig Dispense Refill    Multiple Vitamins-Minerals (THERAPEUTIC MULTIVITAMIN-MINERALS) tablet Take 1 tablet by mouth daily      cetirizine (ZYRTEC) 10 MG tablet Take 1 tablet by mouth daily      lisinopril (PRINIVIL;ZESTRIL) 10 MG tablet Take 1 tablet by mouth daily      allopurinol (ZYLOPRIM) 300 MG tablet Take 1 tablet by mouth daily 30 tablet 5    diclofenac (VOLTAREN) 75 MG EC tablet Take 1 tablet by mouth 2 times daily 60 tablet 5    ALPRAZolam (XANAX) 0.5 MG tablet Take 1 tablet by mouth 3 times daily as needed for Anxiety 60 tablet 0    vardenafil (LEVITRA) 20 MG tablet Take 1 tablet by

## 2023-05-08 NOTE — DISCHARGE INSTRUCTIONS
[address] [facility]   Physician Orders and Discharge Instructions  [address]  Telephone: [phone]    NAME:  Horacio Mota OF BIRTH:  1945  MEDICAL RECORD NUMBER:  [id number]  DATE:  5/8/2023    Discharge condition: Stable    Discharge to: Home    Left via:Private automobile    Accompanied by:  self    ECF/HHA: none    Dressing Orders:  Left leg Wound:   Wash with soap and water. Apply Aqua gabrielle AG to wound bed then apply Calamine Coflex Unnaboot from toes to knee. Change once weekly . Compression Therapy is an important part of your program of care. Compression makes it easier for your body to pump the blood from your legs back to your heart, helping alleviate a condition called chronic venous insufficiency. Chronic venous insufficiency is an underlying cause of your injury, and managing it properly will help you to heal.      Keep the bandage in place at all times unless you experience foot pain or numbness or coolness of the toes. Do not attempt to remove and reapply the bandage system. Give it time. Your bandage may feel tight at first. This is normal. Your bandage will become more comfortable as swelling goes down. Be active, as suggested by your healthcare provider. Daily walks or mild exercise encourages better blood flow to aid healing. Put your feet up when sitting for long periods of time. Keep the bandage dry. Wet compression bandages are more likely to slip down and/or cause damage to good skin. PRECAUTIONS For your safety, compression therapy should be used under the supervision of a healthcare professional. Contact your care provider if you experience any of the following:  Pain that does not go away with elevation. Discoloration or numbness of the toes. The bandage slipping out of place  Fluid leaking through the bandage.     Treatment Orders:  Protein rich diet

## 2023-05-08 NOTE — PLAN OF CARE
[place] Application   Below Knee    NAME:  Puja [facility] OF BIRTH:  1945  MEDICAL RECORD NUMBER:  [id number]  DATE:  5/8/2023    Wily Coca boot: Applied moisturizing agent to dry skin as needed. Appied primary and secondary dressing as ordered. Applied Unna roll from toes to knee overlapping each time. Applied ace wrap or coban from toes to below the knee. Instructed patient/caregiver to keep dressing dry and intact. DO NOT REMOVE DRESSING. Instructed pt/family/caregiver to report excessive draining, loose bandage, wet dressing, severe pain or tingling in toes. Applied [place] dressing below the knee to left lower leg. Unna Boot(s) were applied per  Guidelines.      Electronically signed by Mary Lou Nichols RN on 5/8/2023 at 10:20 AM

## 2023-05-12 ENCOUNTER — HOSPITAL ENCOUNTER (OUTPATIENT)
Dept: WOUND CARE | Age: 78
Discharge: HOME | End: 2023-05-12
Payer: MEDICARE

## 2023-05-12 VITALS
SYSTOLIC BLOOD PRESSURE: 157 MMHG | RESPIRATION RATE: 18 BRPM | HEART RATE: 56 BPM | TEMPERATURE: 98.4 F | DIASTOLIC BLOOD PRESSURE: 75 MMHG

## 2023-05-12 DIAGNOSIS — L97.922 SKIN ULCER OF LEFT LOWER LEG WITH FAT LAYER EXPOSED (HCC): Primary | ICD-10-CM

## 2023-05-12 DIAGNOSIS — I73.9 PVD (PERIPHERAL VASCULAR DISEASE) (HCC): ICD-10-CM

## 2023-05-12 PROCEDURE — 29580 STRAPPING UNNA BOOT: CPT

## 2023-05-12 RX ORDER — LIDOCAINE 50 MG/G
OINTMENT TOPICAL ONCE
OUTPATIENT
Start: 2023-05-12 | End: 2023-05-12

## 2023-05-12 RX ORDER — LIDOCAINE HYDROCHLORIDE 20 MG/ML
JELLY TOPICAL ONCE
OUTPATIENT
Start: 2023-05-12 | End: 2023-05-12

## 2023-05-12 RX ORDER — GENTAMICIN SULFATE 1 MG/G
OINTMENT TOPICAL ONCE
OUTPATIENT
Start: 2023-05-12 | End: 2023-05-12

## 2023-05-12 RX ORDER — BETAMETHASONE DIPROPIONATE 0.05 %
OINTMENT (GRAM) TOPICAL ONCE
OUTPATIENT
Start: 2023-05-12 | End: 2023-05-12

## 2023-05-12 RX ORDER — GINSENG 100 MG
CAPSULE ORAL ONCE
OUTPATIENT
Start: 2023-05-12 | End: 2023-05-12

## 2023-05-12 RX ORDER — CLOBETASOL PROPIONATE 0.5 MG/G
OINTMENT TOPICAL ONCE
OUTPATIENT
Start: 2023-05-12 | End: 2023-05-12

## 2023-05-12 RX ORDER — LIDOCAINE 40 MG/G
CREAM TOPICAL ONCE
OUTPATIENT
Start: 2023-05-12 | End: 2023-05-12

## 2023-05-12 RX ORDER — LIDOCAINE HYDROCHLORIDE 40 MG/ML
SOLUTION TOPICAL ONCE
OUTPATIENT
Start: 2023-05-12 | End: 2023-05-12

## 2023-05-12 RX ORDER — BACITRACIN, NEOMYCIN, POLYMYXIN B 400; 3.5; 5 [USP'U]/G; MG/G; [USP'U]/G
OINTMENT TOPICAL ONCE
OUTPATIENT
Start: 2023-05-12 | End: 2023-05-12

## 2023-05-12 RX ORDER — BACITRACIN ZINC AND POLYMYXIN B SULFATE 500; 1000 [USP'U]/G; [USP'U]/G
OINTMENT TOPICAL ONCE
OUTPATIENT
Start: 2023-05-12 | End: 2023-05-12

## 2023-05-12 NOTE — DISCHARGE INSTRUCTIONS
[address] [facility]   Physician Orders and Discharge Instructions  [address]  Telephone: [phone]    NAME:  Ernesto Joe OF BIRTH:  1945  MEDICAL RECORD NUMBER:  [id number]  DATE:  5/12/2023    Discharge condition: Stable    Discharge to: Home    Left via:Private automobile    Accompanied by:  self    ECF/HHA:     Dressing Orders:  Left leg Wound:   Wash with soap and water. Apply Aqua gabrielle AG to wound bed then apply Calamine Coflex Unnaboot from toes to knee. Change once weekly . Compression Therapy is an important part of your program of care. Compression makes it easier for your body to pump the blood from your legs back to your heart, helping alleviate a condition called chronic venous insufficiency. Chronic venous insufficiency is an underlying cause of your injury, and managing it properly will help you to heal.        Keep the bandage in place at all times unless you experience foot pain or numbness or coolness of the toes. Do not attempt to remove and reapply the bandage system. Give it time. Your bandage may feel tight at first. This is normal. Your bandage will become more comfortable as swelling goes down. Be active, as suggested by your healthcare provider. Daily walks or mild exercise encourages better blood flow to aid healing. Put your feet up when sitting for long periods of time. Keep the bandage dry. Wet compression bandages are more likely to slip down and/or cause damage to good skin. PRECAUTIONS For your safety, compression therapy should be used under the supervision of a healthcare professional. Contact your care provider if you experience any of the following:  Pain that does not go away with elevation. Discoloration or numbness of the toes. The bandage slipping out of place  Fluid leaking through the bandage.      Treatment Orders:  Protein rich

## 2023-05-12 NOTE — PROGRESS NOTES
[place] Application   Below Knee    NAME:  Puja [facility] OF BIRTH:  1945  MEDICAL RECORD NUMBER:  [id number]  DATE:  5/12/2023    Juan F cota: Applied moisturizing agent to dry skin as needed. Appied primary and secondary dressing as ordered. Applied Unna roll from toes to knee overlapping each time. Applied ace wrap or coban from toes to below the knee. Instructed patient/caregiver to keep dressing dry and intact. DO NOT REMOVE DRESSING. Instructed pt/family/caregiver to report excessive draining, loose bandage, wet dressing, severe pain or tingling in toes. Applied [place] dressing below the knee to left lower leg. Unna Boot(s) were applied per  Guidelines.      Electronically signed by Ajit Francis RN on 5/12/2023 at 9:26 AM

## 2023-05-15 ENCOUNTER — HOSPITAL ENCOUNTER (OUTPATIENT)
Dept: WOUND CARE | Age: 78
Discharge: HOME | End: 2023-05-15
Payer: MEDICARE

## 2023-05-15 VITALS
RESPIRATION RATE: 18 BRPM | DIASTOLIC BLOOD PRESSURE: 75 MMHG | WEIGHT: 195 LBS | HEART RATE: 56 BPM | BODY MASS INDEX: 27.3 KG/M2 | SYSTOLIC BLOOD PRESSURE: 150 MMHG | HEIGHT: 71 IN | TEMPERATURE: 98.4 F

## 2023-05-15 DIAGNOSIS — L97.922 SKIN ULCER OF LEFT LOWER LEG WITH FAT LAYER EXPOSED (HCC): Primary | ICD-10-CM

## 2023-05-15 DIAGNOSIS — I73.9 PVD (PERIPHERAL VASCULAR DISEASE) (HCC): ICD-10-CM

## 2023-05-15 PROCEDURE — 99213 OFFICE O/P EST LOW 20 MIN: CPT

## 2023-05-15 PROCEDURE — 99212 OFFICE O/P EST SF 10 MIN: CPT | Performed by: NURSE PRACTITIONER

## 2023-05-15 NOTE — DISCHARGE INSTRUCTIONS
[address] and [facility] Oxygen Therapy   Physician Orders and Discharge Instructions  [address]  Telephone: [phone]    NAME:  Andres Yanes OF BIRTH:  1945  MEDICAL RECORD NUMBER:  [id number]  DATE:  5/15/2023    Discharge condition: Stable    Discharge to: Home    Left via:Private automobile    Accompanied by:  self    ECF/HHA: [facility]    Dressing Orders:  Left leg wound: Soap and water wash. Apply a double layer of Xeroform (the yellow sticky dressing) over the wound bed daily. Secure with dry gauze and rolled gauze then secure with coban daily. Treatment Orders:  Protein rich diet (unless restricted by your physician); Multivitamin daily; Elevate legs above the level of your heart when sitting 3-4 times daily for at least one hour each time, avoid standing for long periods of time. [address] [place] [address],[address] follow up visit ____1 week with Kasia_________________________  (Please note your next appointment above and if you are unable to keep, kindly give a 24 hour notice. Thank you.)          If you experience any of the following, please call the [address] during business hours:    * Increase in Pain  * Temperature over 101  * Increase in drainage from your wound  * Drainage with a foul odor  * Bleeding  * Increase in swelling  * Need for compression bandage changes due to slippage, breakthrough drainage. If you need medical attention outside of the business hours of the [address] please contact your PCP or go to the nearest emergency room.

## 2023-05-15 NOTE — HOME CARE
[address]:     [facility] [address] [facility], [address] [place] [address] Road  p: [phone] f: [phone]     Ordering Center:     [address]  [address], JACOB [address] [address] [phone]  WOUND CARE Dept: [address] [facility] [phone]    Patient Information:      [address] [place]ter [address]   [phone]   : 1945  AGE: 69 y.o. GENDER: male   EPISODE DATE: 5/15/2023    Insurance:      PRIMARY INSURANCE:  Plan: Nikolas Howard ESSENTIAL/PLUS  Coverage: BCBS MEDICARE  Effective Date: 2015  Group Number: [unfilled]  Subscriber Number: [id number] - (Medicare Managed)    Payer/Plan Subscr  Sex Relation Sub. Ins. ID Effective Group Num   1.  BCBS MEDICARE* Livan Aguilera 1945 Male Self [id number] 1/1/15 [id number]                                   [address]       Patient Wound Information:      Problem List Items Addressed This Visit          Circulatory    PVD (peripheral vascular disease) ([address]       Other    * (Principal) Skin ulcer of left lower leg with fat layer exposed ([facility] [address] - Primary       WOUNDS REQUIRING DRESSING SUPPLIES:     Wound 23 Pretibial Left wound 1-left leg venous (Active)   Wound Image   05/15/23 1018   Wound Etiology Venous 05/15/23 1018   Dressing Status Old drainage noted;New dressing applied 05/15/23 1018   Wound Cleansed Soap and water 05/15/23 1018   Dressing/Treatment Xeroform 05/15/23 1018   Wound Length (cm) 2.9 cm 05/15/23 1018   Wound Width (cm) 4.8 cm 05/15/23 1018   Wound Depth (cm) 0.1 cm 05/15/23 1018   Wound Surface Area (cm^2) 13.92 cm^2 05/15/23 1018   Change in Wound Size % (l*w) 62.88 05/15/23 1018   Wound Volume (cm^3) 1.392 cm^3 05/15/23 1018   Wound Healing % 63 05/15/23 1018   Post-Procedure Length (cm) 2.9 cm 23 1014   Post-Procedure Width (cm) 4.8 cm 23 1014   Post-Procedure Depth (cm) 0.1

## 2023-05-15 NOTE — PLAN OF CARE
Problem: Discharge Planning  Goal: Discharge to home or other facility with appropriate resources  Outcome: Progressing     Problem: Venous:  Goal: Signs of wound healing will improve  Description: Signs of wound healing will improve  Outcome: Progressing

## 2023-05-15 NOTE — PROGRESS NOTES
[address]   Progress Note and Procedure Note      [address] RECORD NUMBER:  [id number]  AGE: 69 y.o. GENDER: male  : 1945  EPISODE DATE:  5/15/2023    Subjective:     Chief Complaint   Patient presents with    Wound Check     Left leg wound         HISTORY of PRESENT ILLNESS HPI     Barbie Sandoval is a 69 y.o. male who presents today for wound/ulcer evaluation. History of Wound Context: left leg wound follow up/eval and treat    Ulcer Identification:  Ulcer Type: venous  Contributing Factors: none    Wound: N/A        PAST MEDICAL HISTORY        Diagnosis Date    Hepatitis A     Hyperlipidemia     mild    Hypertension     Panic attacks        PAST SURGICAL HISTORY    Past Surgical History:   Procedure Laterality Date    COLONOSCOPY  2004    int. hemorrhoids,     HIP ARTHROPLASTY Right     VASCULAR SURGERY      left leg       FAMILY HISTORY    History reviewed. No pertinent family history.     SOCIAL HISTORY    Social History     Tobacco Use    Smoking status: Never    Smokeless tobacco: Never   Substance Use Topics    Alcohol use: Yes    Drug use: No       ALLERGIES    Allergies   Allergen Reactions    Norco [Hydrocodone-Acetaminophen] Other (See Comments)     insomnia    Tetracyclines & Related        MEDICATIONS    Current Outpatient Medications on File Prior to Encounter   Medication Sig Dispense Refill    Multiple Vitamins-Minerals (THERAPEUTIC MULTIVITAMIN-MINERALS) tablet Take 1 tablet by mouth daily      cetirizine (ZYRTEC) 10 MG tablet Take 1 tablet by mouth daily      lisinopril (PRINIVIL;ZESTRIL) 10 MG tablet Take 1 tablet by mouth daily      allopurinol (ZYLOPRIM) 300 MG tablet Take 1 tablet by mouth daily 30 tablet 5    diclofenac (VOLTAREN) 75 MG EC tablet Take 1 tablet by mouth 2 times daily 60 tablet 5    ALPRAZolam (XANAX) 0.5 MG tablet Take 1 tablet by mouth 3 times daily as needed for Anxiety 60 tablet 0    vardenafil (LEVITRA) 20 MG

## 2023-05-25 ENCOUNTER — HOSPITAL ENCOUNTER (OUTPATIENT)
Dept: WOUND CARE | Age: 78
Discharge: HOME | End: 2023-05-25
Payer: MEDICARE

## 2023-05-25 VITALS
HEART RATE: 75 BPM | DIASTOLIC BLOOD PRESSURE: 83 MMHG | TEMPERATURE: 98.5 F | BODY MASS INDEX: 27.3 KG/M2 | RESPIRATION RATE: 18 BRPM | SYSTOLIC BLOOD PRESSURE: 151 MMHG | WEIGHT: 195 LBS | HEIGHT: 71 IN

## 2023-05-25 DIAGNOSIS — I73.9 PVD (PERIPHERAL VASCULAR DISEASE) (HCC): ICD-10-CM

## 2023-05-25 DIAGNOSIS — L97.922 SKIN ULCER OF LEFT LOWER LEG WITH FAT LAYER EXPOSED (HCC): Primary | ICD-10-CM

## 2023-05-25 PROCEDURE — 97597 DBRDMT OPN WND 1ST 20 CM/<: CPT

## 2023-05-25 RX ORDER — BACITRACIN ZINC AND POLYMYXIN B SULFATE 500; 1000 [USP'U]/G; [USP'U]/G
OINTMENT TOPICAL ONCE
OUTPATIENT
Start: 2023-05-25 | End: 2023-05-25

## 2023-05-25 RX ORDER — CLOBETASOL PROPIONATE 0.5 MG/G
OINTMENT TOPICAL ONCE
OUTPATIENT
Start: 2023-05-25 | End: 2023-05-25

## 2023-05-25 RX ORDER — LIDOCAINE 40 MG/G
CREAM TOPICAL ONCE
OUTPATIENT
Start: 2023-05-25 | End: 2023-05-25

## 2023-05-25 RX ORDER — LIDOCAINE 50 MG/G
OINTMENT TOPICAL ONCE
OUTPATIENT
Start: 2023-05-25 | End: 2023-05-25

## 2023-05-25 RX ORDER — BACITRACIN, NEOMYCIN, POLYMYXIN B 400; 3.5; 5 [USP'U]/G; MG/G; [USP'U]/G
OINTMENT TOPICAL ONCE
OUTPATIENT
Start: 2023-05-25 | End: 2023-05-25

## 2023-05-25 RX ORDER — LIDOCAINE HYDROCHLORIDE 20 MG/ML
JELLY TOPICAL ONCE
OUTPATIENT
Start: 2023-05-25 | End: 2023-05-25

## 2023-05-25 RX ORDER — GENTAMICIN SULFATE 1 MG/G
OINTMENT TOPICAL ONCE
OUTPATIENT
Start: 2023-05-25 | End: 2023-05-25

## 2023-05-25 RX ORDER — LIDOCAINE HYDROCHLORIDE 40 MG/ML
SOLUTION TOPICAL ONCE
OUTPATIENT
Start: 2023-05-25 | End: 2023-05-25

## 2023-05-25 RX ORDER — GINSENG 100 MG
CAPSULE ORAL ONCE
OUTPATIENT
Start: 2023-05-25 | End: 2023-05-25

## 2023-05-25 RX ORDER — BETAMETHASONE DIPROPIONATE 0.05 %
OINTMENT (GRAM) TOPICAL ONCE
OUTPATIENT
Start: 2023-05-25 | End: 2023-05-25

## 2023-05-25 NOTE — DISCHARGE INSTRUCTIONS
[address] [facility] Therapy   Physician Orders and Discharge Instructions  [address]  Telephone: [phone]    NAME:  Crow Casillas OF BIRTH:  1945  MEDICAL RECORD NUMBER:  [id number]  DATE:  5/25/2023    Discharge condition: Stable    Discharge to: Home    Left via:Private automobile    Accompanied by:  self    ECF/HHA: [place]     Dressing Orders:  Left leg wound: Soap and water wash. Apply wound gel to wound bed, then  apply a double layer of Xeroform (the yellow sticky dressing) over the wound bed daily. Secure with dry gauze and rolled gauze then secure with coban daily. Treatment Orders:  Protein rich diet (unless restricted by your physician); Multivitamin daily; Elevate legs above the level of your heart when sitting 3-4 times daily for at least one hour each time, avoid standing for long periods of time. [address] [place] [address],[address] follow up visit ____2 weeks with Kasia_________________________  (Please note your next appointment above and if you are unable to keep, kindly give a 24 hour notice. Thank you.)          If you experience any of the following, please call the [address] during business hours:    * Increase in Pain  * Temperature over 101  * Increase in drainage from your wound  * Drainage with a foul odor  * Bleeding  * Increase in swelling  * Need for compression bandage changes due to slippage, breakthrough drainage. If you need medical attention outside of the business hours of the [address] please contact your PCP or go to the nearest emergency room.

## 2023-05-25 NOTE — PROGRESS NOTES
[address]   Progress Note and Procedure Note      [address] [place] RECORD NUMBER:  [id number]  AGE: 69 y.o. GENDER: male  : 1945  EPISODE DATE:  2023    Subjective:     Chief Complaint   Patient presents with    Wound Check     Patient presents for recheck of left leg wound      HISTORY of PRESENT ILLNESS HPI     Augie Vargas is a 69 y.o. male who presents today for wound/ulcer evaluation. History of Wound Context: left leg wound follow up/eval and treat    Ulcer Identification:  Ulcer Type: traumatic  Contributing Factors: none    Wound: Abrasion        PAST MEDICAL HISTORY        Diagnosis Date    Hepatitis A     Hyperlipidemia     mild    Hypertension     Panic attacks        PAST SURGICAL HISTORY    Past Surgical History:   Procedure Laterality Date    COLONOSCOPY  2004    int. hemorrhoids,     HIP ARTHROPLASTY Right     VASCULAR SURGERY      left leg       FAMILY HISTORY    History reviewed. No pertinent family history.     SOCIAL HISTORY    Social History     Tobacco Use    Smoking status: Never    Smokeless tobacco: Never   Substance Use Topics    Alcohol use: Yes    Drug use: No       ALLERGIES    Allergies   Allergen Reactions    Norco [Hydrocodone-Acetaminophen] Other (See Comments)     insomnia    Tetracyclines & Related        MEDICATIONS    Current Outpatient Medications on File Prior to Encounter   Medication Sig Dispense Refill    Multiple Vitamins-Minerals (THERAPEUTIC MULTIVITAMIN-MINERALS) tablet Take 1 tablet by mouth daily      cetirizine (ZYRTEC) 10 MG tablet Take 1 tablet by mouth daily      lisinopril (PRINIVIL;ZESTRIL) 10 MG tablet Take 1 tablet by mouth daily      allopurinol (ZYLOPRIM) 300 MG tablet Take 1 tablet by mouth daily 30 tablet 5    ALPRAZolam (XANAX) 0.5 MG tablet Take 1 tablet by mouth 3 times daily as needed for Anxiety 60 tablet 0    vardenafil (LEVITRA) 20 MG tablet Take 1 tablet by mouth as needed for

## 2023-06-06 NOTE — DISCHARGE INSTRUCTIONS
[address] [facility] O[facility] Therapy   Physician Orders and Discharge Instructions  [address]  Telephone: [phone]    NAME:  True Guest OF BIRTH:  1945  MEDICAL RECORD NUMBER:  [id number]  DATE:  6/8/23    Discharge condition: Stable    Discharge to: Home    Left via:Private automobile    Accompanied by:  self    ECF/HHA: [place]       Dressing Orders:   Left leg wound: Soap and water wash. Apply a double layer of Xeroform (the yellow sticky dressing) over the wound bed daily. Secure with dry gauze and rolled gauze then secure with Ace wrap 6 inch daily. Treatment Orders:   Protein rich diet (unless restricted by your physician); Multivitamin daily; Elevate legs above the level of your heart when sitting 3-4 times daily for at least one hour each time, avoid standing for long periods of time. [facility] follow up visit _________1 week____________________  (Please note your next appointment above and if you are unable to keep, kindly give a 24 hour notice. Thank you.)          If you experience any of the following, please call the [address] during business hours:    * Increase in Pain  * Temperature over 101  * Increase in drainage from your wound  * Drainage with a foul odor  * Bleeding  * Increase in swelling  * Need for compression bandage changes due to slippage, breakthrough drainage. If you need medical attention outside of the business hours of the [address] please contact your PCP or go to the nearest emergency room.

## 2023-06-08 ENCOUNTER — HOSPITAL ENCOUNTER (OUTPATIENT)
Dept: WOUND CARE | Age: 78
Discharge: HOME | End: 2023-06-08
Payer: MEDICARE

## 2023-06-08 VITALS
SYSTOLIC BLOOD PRESSURE: 147 MMHG | BODY MASS INDEX: 27.3 KG/M2 | WEIGHT: 195 LBS | DIASTOLIC BLOOD PRESSURE: 84 MMHG | RESPIRATION RATE: 18 BRPM | HEIGHT: 71 IN | TEMPERATURE: 98.1 F | HEART RATE: 64 BPM

## 2023-06-08 DIAGNOSIS — L97.922 SKIN ULCER OF LEFT LOWER LEG WITH FAT LAYER EXPOSED (HCC): Primary | ICD-10-CM

## 2023-06-08 DIAGNOSIS — I73.9 PVD (PERIPHERAL VASCULAR DISEASE) (HCC): ICD-10-CM

## 2023-06-08 PROCEDURE — 99212 OFFICE O/P EST SF 10 MIN: CPT | Performed by: NURSE PRACTITIONER

## 2023-06-08 PROCEDURE — 99213 OFFICE O/P EST LOW 20 MIN: CPT

## 2023-06-08 RX ORDER — SULFAMETHOXAZOLE AND TRIMETHOPRIM 800; 160 MG/1; MG/1
1 TABLET ORAL 2 TIMES DAILY
COMMUNITY

## 2023-06-08 NOTE — PROGRESS NOTES
to one hour before sex) 20 tablet 3    PRILOSEC OTC 20 MG tablet take 1 tablet by mouth twice a day 56 tablet 12     No current facility-administered medications on file prior to encounter. REVIEW OF SYSTEMS    Pertinent items are noted in HPI.     Objective:      BP (!) 147/84   Pulse 64   Temp 98.1 °F (36.7 °C) (Temporal)   Resp 18   Ht 5' 11\" (1.803 m)   Wt 195 lb (88.5 kg)   BMI 27.20 kg/m²     Wt Readings from Last 3 Encounters:   06/08/23 195 lb (88.5 kg)   05/25/23 195 lb (88.5 kg)   05/15/23 195 lb (88.5 kg)       PHYSICAL EXAM    General Appearance: alert and oriented to person, place and time, well developed and well- nourished, in no acute distress  Skin: warm and dry, no rash or erythema  Head: normocephalic and atraumatic  Eyes: pupils equal, round, and reactive to light, extraocular eye movements intact, conjunctivae normal  ENT: tympanic membrane, external ear and ear canal normal bilaterally, nose without deformity, nasal mucosa and turbinates normal without polyps  Neck: supple and non-tender without mass, no thyromegaly or thyroid nodules, no cervical lymphadenopathy  Pulmonary/Chest: clear to auscultation bilaterally- no wheezes, rales or rhonchi, normal air movement, no respiratory distress  Extremities: no cyanosis, clubbing or edema  Musculoskeletal: normal range of motion, no joint swelling, deformity or tenderness  Neurologic: reflexes normal and symmetric, no cranial nerve deficit, gait, coordination and speech normal      Assessment:      Patient Active Problem List   Diagnosis Code    Anxiety F41.9    Low back pain M54.50    Erectile dysfunction N52.9    Skin ulcer of left lower leg with fat layer exposed ([address] Y92.504    PVD (peripheral vascular disease) ([address] I73.9       Wound 04/18/23 Pretibial Left wound 1-left leg venous (Active)   Wound Image   06/08/23 0934   Wound Etiology Venous 06/08/23 0934   Dressing Status New dressing applied 06/08/23 0938   Wound Cleansed Soap and

## 2023-07-05 ENCOUNTER — HOSPITAL ENCOUNTER (OUTPATIENT)
Dept: WOUND CARE | Age: 78
Discharge: HOME | End: 2023-07-05
Payer: MEDICARE

## 2023-07-05 VITALS
TEMPERATURE: 98.7 F | DIASTOLIC BLOOD PRESSURE: 77 MMHG | HEART RATE: 67 BPM | WEIGHT: 195 LBS | SYSTOLIC BLOOD PRESSURE: 183 MMHG | BODY MASS INDEX: 27.3 KG/M2 | RESPIRATION RATE: 18 BRPM | HEIGHT: 71 IN

## 2023-07-05 DIAGNOSIS — L97.922 SKIN ULCER OF LEFT LOWER LEG WITH FAT LAYER EXPOSED (HCC): Primary | ICD-10-CM

## 2023-07-05 DIAGNOSIS — I73.9 PVD (PERIPHERAL VASCULAR DISEASE) (HCC): ICD-10-CM

## 2023-07-05 PROCEDURE — 97597 DBRDMT OPN WND 1ST 20 CM/<: CPT

## 2023-07-05 PROCEDURE — 6370000000 HC RX 637 (ALT 250 FOR IP): Performed by: NURSE PRACTITIONER

## 2023-07-05 RX ORDER — LIDOCAINE HYDROCHLORIDE 20 MG/ML
JELLY TOPICAL ONCE
Status: COMPLETED | OUTPATIENT
Start: 2023-07-05 | End: 2023-07-05

## 2023-07-05 RX ORDER — LIDOCAINE 40 MG/G
CREAM TOPICAL ONCE
OUTPATIENT
Start: 2023-07-05 | End: 2023-07-05

## 2023-07-05 RX ORDER — GENTAMICIN SULFATE 1 MG/G
OINTMENT TOPICAL ONCE
OUTPATIENT
Start: 2023-07-05 | End: 2023-07-05

## 2023-07-05 RX ORDER — GINSENG 100 MG
CAPSULE ORAL ONCE
OUTPATIENT
Start: 2023-07-05 | End: 2023-07-05

## 2023-07-05 RX ORDER — CLOBETASOL PROPIONATE 0.5 MG/G
OINTMENT TOPICAL ONCE
OUTPATIENT
Start: 2023-07-05 | End: 2023-07-05

## 2023-07-05 RX ORDER — BACITRACIN ZINC AND POLYMYXIN B SULFATE 500; 1000 [USP'U]/G; [USP'U]/G
OINTMENT TOPICAL ONCE
OUTPATIENT
Start: 2023-07-05 | End: 2023-07-05

## 2023-07-05 RX ORDER — IBUPROFEN 200 MG
TABLET ORAL ONCE
OUTPATIENT
Start: 2023-07-05 | End: 2023-07-05

## 2023-07-05 RX ORDER — LIDOCAINE HYDROCHLORIDE 40 MG/ML
SOLUTION TOPICAL ONCE
OUTPATIENT
Start: 2023-07-05 | End: 2023-07-05

## 2023-07-05 RX ORDER — LIDOCAINE HYDROCHLORIDE 20 MG/ML
JELLY TOPICAL ONCE
OUTPATIENT
Start: 2023-07-05 | End: 2023-07-05

## 2023-07-05 RX ORDER — LIDOCAINE 50 MG/G
OINTMENT TOPICAL ONCE
OUTPATIENT
Start: 2023-07-05 | End: 2023-07-05

## 2023-07-05 RX ORDER — BETAMETHASONE DIPROPIONATE 0.05 %
OINTMENT (GRAM) TOPICAL ONCE
OUTPATIENT
Start: 2023-07-05 | End: 2023-07-05

## 2023-07-05 RX ADMIN — LIDOCAINE HYDROCHLORIDE: 20 JELLY TOPICAL at 15:17

## 2023-07-05 NOTE — PLAN OF CARE
Problem: Discharge Planning  Goal: Discharge to home or other facility with appropriate resources  Outcome: Progressing     Problem: Wound:  Goal: Will show signs of wound healing; wound closure and no evidence of infection  Description: Will show signs of wound healing; wound closure and no evidence of infection  Outcome: Progressing     Problem: Venous:  Goal: Signs of wound healing will improve  Description: Signs of wound healing will improve  Outcome: Progressing     Problem: Weight control:  Goal: Ability to maintain an optimal weight for height and age will be supported  Description: Ability to maintain an optimal weight for height and age will be supported  Outcome: Progressing     Problem: Falls - Risk of:  Goal: Will remain free from falls  Description: Will remain free from falls  Outcome: Progressing

## 2023-07-05 NOTE — DISCHARGE INSTRUCTIONS
[address] and Hyperbaric Oxygen Therapy   Physician Orders and Discharge Instructions  [address] [facility],[address] [place] [address], [address]  Telephone: [phone]    NAME:  Kaiser Edgar OF BIRTH:  1945  MEDICAL RECORD NUMBER:  [id number]  DATE:  7/5/2023    Discharge condition: Stable    Discharge to: Home    Left via:Private automobile    Accompanied by:  self    ECF/HHA: [place]        Dressing Orders:   Left leg wound: Soap and water wash. Apply a double layer of Xeroform (the yellow sticky dressing) over the wound bed daily. Secure with dry gauze and rolled gauze then secure with Ace wrap 6 inch daily. Treatment Orders:   Protein rich diet (unless restricted by your physician); Multivitamin daily; Elevate legs above the level of your heart when sitting 3-4 times daily for at least one hour each time, avoid standing for long periods of time. [facility] follow up visit __________1 week___________________  (Please note your next appointment above and if you are unable to keep, kindly give a 24 hour notice. Thank you.)          If you experience any of the following, please call the [address] [place] [address] during business hours:    * Increase in Pain  * Temperature over 101  * Increase in drainage from your wound  * Drainage with a foul odor  * Bleeding  * Increase in swelling  * Need for compression bandage changes due to slippage, breakthrough drainage. If you need medical attention outside of the business hours of the [address] [place] [address] please contact your PCP or go to the nearest emergency room.

## 2023-07-05 NOTE — PROGRESS NOTES
[address]   Progress Note and Procedure Note      Heike Mckeon RECORD NUMBER:  [id number]  AGE: 69 y.o. GENDER: male  : 1945  EPISODE DATE:  2023    Subjective:     Chief Complaint   Patient presents with    Wound Check     Patient presents today for recheck left leg wounds. HISTORY of PRESENT ILLNESS KATHRYN Dumont is a 69 y.o. male who presents today for wound/ulcer evaluation. History of Wound Context: left leg wound follow up/eval and treat    Ulcer Identification:  Ulcer Type: venous  Contributing Factors: edema    Wound: N/A        PAST MEDICAL HISTORY        Diagnosis Date    Hepatitis A     Hyperlipidemia     mild    Hypertension     Panic attacks        PAST SURGICAL HISTORY    Past Surgical History:   Procedure Laterality Date    COLONOSCOPY  2004    int. hemorrhoids,     HIP ARTHROPLASTY Right     VASCULAR SURGERY      left leg       FAMILY HISTORY    History reviewed. No pertinent family history.     SOCIAL HISTORY    Social History     Tobacco Use    Smoking status: Never    Smokeless tobacco: Never   Substance Use Topics    Alcohol use: Yes    Drug use: No       ALLERGIES    Allergies   Allergen Reactions    Norco [Hydrocodone-Acetaminophen] Other (See Comments)     insomnia    Tetracyclines & Related        MEDICATIONS    Current Outpatient Medications on File Prior to Encounter   Medication Sig Dispense Refill    sulfamethoxazole-trimethoprim (BACTRIM DS;SEPTRA DS) 800-160 MG per tablet Take 1 tablet by mouth 2 times daily Started on 23      Multiple Vitamins-Minerals (THERAPEUTIC MULTIVITAMIN-MINERALS) tablet Take 1 tablet by mouth daily      cetirizine (ZYRTEC) 10 MG tablet Take 1 tablet by mouth daily      lisinopril (PRINIVIL;ZESTRIL) 10 MG tablet Take 1 tablet by mouth daily      allopurinol (ZYLOPRIM) 300 MG tablet Take 1 tablet by mouth daily 30 tablet 5    ALPRAZolam (XANAX) 0.5 MG tablet Take 1 tablet

## 2023-07-13 ENCOUNTER — HOSPITAL ENCOUNTER (OUTPATIENT)
Dept: WOUND CARE | Age: 78
Discharge: HOME | End: 2023-07-13
Payer: MEDICARE

## 2023-07-13 VITALS
HEIGHT: 71 IN | DIASTOLIC BLOOD PRESSURE: 64 MMHG | HEART RATE: 69 BPM | SYSTOLIC BLOOD PRESSURE: 143 MMHG | TEMPERATURE: 98 F | BODY MASS INDEX: 27.3 KG/M2 | WEIGHT: 195 LBS | RESPIRATION RATE: 18 BRPM

## 2023-07-13 DIAGNOSIS — L97.922 SKIN ULCER OF LEFT LOWER LEG WITH FAT LAYER EXPOSED (HCC): Primary | ICD-10-CM

## 2023-07-13 DIAGNOSIS — I73.9 PVD (PERIPHERAL VASCULAR DISEASE) (HCC): ICD-10-CM

## 2023-07-13 PROCEDURE — 97597 DBRDMT OPN WND 1ST 20 CM/<: CPT

## 2023-07-13 PROCEDURE — 6370000000 HC RX 637 (ALT 250 FOR IP): Performed by: NURSE PRACTITIONER

## 2023-07-13 RX ORDER — LIDOCAINE HYDROCHLORIDE 20 MG/ML
JELLY TOPICAL ONCE
Status: COMPLETED | OUTPATIENT
Start: 2023-07-13 | End: 2023-07-13

## 2023-07-13 RX ORDER — BACITRACIN ZINC AND POLYMYXIN B SULFATE 500; 1000 [USP'U]/G; [USP'U]/G
OINTMENT TOPICAL ONCE
OUTPATIENT
Start: 2023-07-13 | End: 2023-07-13

## 2023-07-13 RX ORDER — LIDOCAINE HYDROCHLORIDE 20 MG/ML
JELLY TOPICAL ONCE
OUTPATIENT
Start: 2023-07-13 | End: 2023-07-13

## 2023-07-13 RX ORDER — LIDOCAINE 50 MG/G
OINTMENT TOPICAL ONCE
OUTPATIENT
Start: 2023-07-13 | End: 2023-07-13

## 2023-07-13 RX ORDER — LIDOCAINE 40 MG/G
CREAM TOPICAL ONCE
OUTPATIENT
Start: 2023-07-13 | End: 2023-07-13

## 2023-07-13 RX ORDER — GINSENG 100 MG
CAPSULE ORAL ONCE
OUTPATIENT
Start: 2023-07-13 | End: 2023-07-13

## 2023-07-13 RX ORDER — BETAMETHASONE DIPROPIONATE 0.05 %
OINTMENT (GRAM) TOPICAL ONCE
OUTPATIENT
Start: 2023-07-13 | End: 2023-07-13

## 2023-07-13 RX ORDER — CLOBETASOL PROPIONATE 0.5 MG/G
OINTMENT TOPICAL ONCE
OUTPATIENT
Start: 2023-07-13 | End: 2023-07-13

## 2023-07-13 RX ORDER — GENTAMICIN SULFATE 1 MG/G
OINTMENT TOPICAL ONCE
OUTPATIENT
Start: 2023-07-13 | End: 2023-07-13

## 2023-07-13 RX ORDER — LIDOCAINE HYDROCHLORIDE 40 MG/ML
SOLUTION TOPICAL ONCE
OUTPATIENT
Start: 2023-07-13 | End: 2023-07-13

## 2023-07-13 RX ORDER — IBUPROFEN 200 MG
TABLET ORAL ONCE
OUTPATIENT
Start: 2023-07-13 | End: 2023-07-13

## 2023-07-13 RX ADMIN — LIDOCAINE HYDROCHLORIDE: 20 JELLY TOPICAL at 09:39

## 2023-07-13 NOTE — DISCHARGE INSTRUCTIONS
[address] and Hyperbaric Oxygen Therapy   Physician Orders and Discharge Instructions  [address] [facility],[address] [place] [address] Blvd, [address]  Telephone: [phone]    NAME:  Janey Dillon OF BIRTH:  1945  MEDICAL RECORD NUMBER:  [id number]  DATE:  7/13/2023    Discharge condition: Stable    Discharge to: Home    Left via:Private automobile    Accompanied by:  self    ECF/HHA: [facility]        Dressing Orders:   Left leg wound medial and anterior: Soap and water wash. Apply a double layer Vaseline gauze (4 layres) over the wound bed daily. Secure with dry gauze and rolled gauze then secure with Ace wrap 6 inch daily. Treatment Orders:   Protein rich diet (unless restricted by your physician); Multivitamin daily; Elevate legs above the level of your heart when sitting 3-4 times daily for at least one hour each time, avoid standing for long periods of time. [facility] follow up visit __________1 week___________________  (Please note your next appointment above and if you are unable to keep, kindly give a 24 hour notice. Thank you.)          If you experience any of the following, please call the [address] [place], [place] [address]i [place] during business hours:    * Increase in Pain  * Temperature over 101  * Increase in drainage from your wound  * Drainage with a foul odor  * Bleeding  * Increase in swelling  * Need for compression bandage changes due to slippage, breakthrough drainage. If you need medical attention outside of the business hours of the [address] [place] [address] please contact your PCP or go to the nearest emergency room.

## 2023-07-13 NOTE — PROGRESS NOTES
[address]   Progress Note and Procedure Note      Heike Mckeon RECORD NUMBER:  [id number]  AGE: 69 y.o. GENDER: male  : 1945  EPISODE DATE:  2023    Subjective:     Chief Complaint   Patient presents with    Wound Check     Patient presents for recheck of left leg wound      HISTORY of PRESENT ILLNESS HPI     Alex Meza is a 69 y.o. male who presents today for wound/ulcer evaluation. History of Wound Context: left leg wound follow up/eval and treat    Ulcer Identification:  Ulcer Type: venous  Contributing Factors: edema    Wound: N/A        PAST MEDICAL HISTORY        Diagnosis Date    Hepatitis A     Hyperlipidemia     mild    Hypertension     Panic attacks        PAST SURGICAL HISTORY    Past Surgical History:   Procedure Laterality Date    COLONOSCOPY  2004    int. hemorrhoids,     HIP ARTHROPLASTY Right     VASCULAR SURGERY      left leg       FAMILY HISTORY    History reviewed. No pertinent family history.     SOCIAL HISTORY    Social History     Tobacco Use    Smoking status: Never    Smokeless tobacco: Never   Substance Use Topics    Alcohol use: Yes    Drug use: No       ALLERGIES    Allergies   Allergen Reactions    Norco [Hydrocodone-Acetaminophen] Other (See Comments)     insomnia    Tetracyclines & Related        MEDICATIONS    Current Outpatient Medications on File Prior to Encounter   Medication Sig Dispense Refill    sulfamethoxazole-trimethoprim (BACTRIM DS;SEPTRA DS) 800-160 MG per tablet Take 1 tablet by mouth 2 times daily Started on 23      Multiple Vitamins-Minerals (THERAPEUTIC MULTIVITAMIN-MINERALS) tablet Take 1 tablet by mouth daily      cetirizine (ZYRTEC) 10 MG tablet Take 1 tablet by mouth daily      lisinopril (PRINIVIL;ZESTRIL) 10 MG tablet Take 1 tablet by mouth daily      allopurinol (ZYLOPRIM) 300 MG tablet Take 1 tablet by mouth daily 30 tablet 5    ALPRAZolam (XANAX) 0.5 MG tablet Take 1 tablet by

## 2023-07-13 NOTE — HOME CARE
[address] [address] [address]:     [facility] 2[address] [facility] 30[address] [facility], [address]  p: [phone]: [phone]     Ordering Center:     [address] [place] [address] [phone]  WOUND CARE Dept: [id number] [place] [address] [phone]    Patient Information:      Fiordaliza Lainez  [address] [facility] [address]   [phone]   : 1945  AGE: 69 y.o. GENDER: male   EPISODE DATE: 2023    Insurance:      PRIMARY INSURANCE:  Plan: [employer] ESSENTIAL/PLUS  Coverage: [employer] MEDICARE  Effective Date: 2015  Group Number: [unfilled]  Subscriber Number: [id number] - (Medicare Managed)    Payer/Plan Subscr  Sex Relation Sub. Ins. ID Effective Group Num   1. BCBS MEDICARE* Fiordaliza Lainez 1945 Male Self [id number] 1/1/15 [id number]                                   [address]       Patient Wound Information:      Problem List Items Addressed This Visit          Circulatory    PVD (peripheral vascular disease) ([address])    Relevant Orders    Initiate Outpatient Wound Care Protocol       Other    * (Principal) Skin ulcer of left lower leg with fat layer exposed ([address]) - Primary    Relevant Orders    Initiate Outpatient Wound Care Protocol       WOUNDS REQUIRING DRESSING SUPPLIES:     Wound 23 Pretibial Left wound 1-left leg venous (Active)   Wound Image   23   Wound Etiology Venous 2339   Dressing Status New dressing applied 23 1006   Wound Cleansed Soap and water 2339   Dressing/Treatment Ace wrap;Gauze dressing/dressing sponge;Petroleum impregnated gauze; Roll gauze 23 1006   Wound Length (cm) 0.6 cm 2339   Wound Width (cm) 0.5 cm 2339   Wound Depth (cm) 0.1 cm 2339   Wound Surface Area (cm^2) 0.3 cm^2 2339   Change in Wound Size % (l*w) 99.2 23 0939   Wound Volume (cm^3) 0.03 cm^3 23

## 2023-07-19 NOTE — DISCHARGE INSTRUCTIONS
bandage. Treatment Orders:   Protein rich diet (unless restricted by your physician); Multivitamin daily; Elevate legs above the level of your heart when sitting 3-4 times daily for at least one hour each time, avoid standing for long periods of time. [facility] follow up visit __________1 week_______________  (Please note your next appointment above and if you are unable to keep, kindly give a 24 hour notice. Thank you.)          If you experience any of the following, please call the [facility] during business hours:    * Increase in Pain  * Temperature over 101  * Increase in drainage from your wound  * Drainage with a foul odor  * Bleeding  * Increase in swelling  * Need for compression bandage changes due to slippage, breakthrough drainage. If you need medical attention outside of the business hours of the [facility] please contact your PCP or go to the nearest emergency room.

## 2023-07-20 ENCOUNTER — HOSPITAL ENCOUNTER (OUTPATIENT)
Dept: WOUND CARE | Age: 78
Discharge: HOME | End: 2023-07-20
Payer: MEDICARE

## 2023-07-20 VITALS
HEIGHT: 71 IN | WEIGHT: 195 LBS | TEMPERATURE: 98.4 F | HEART RATE: 67 BPM | BODY MASS INDEX: 27.3 KG/M2 | RESPIRATION RATE: 18 BRPM | DIASTOLIC BLOOD PRESSURE: 78 MMHG | SYSTOLIC BLOOD PRESSURE: 180 MMHG

## 2023-07-20 DIAGNOSIS — I73.9 PVD (PERIPHERAL VASCULAR DISEASE) (HCC): ICD-10-CM

## 2023-07-20 DIAGNOSIS — L97.922 SKIN ULCER OF LEFT LOWER LEG WITH FAT LAYER EXPOSED (HCC): Primary | ICD-10-CM

## 2023-07-20 PROCEDURE — 29580 STRAPPING UNNA BOOT: CPT

## 2023-07-20 PROCEDURE — 99212 OFFICE O/P EST SF 10 MIN: CPT | Performed by: NURSE PRACTITIONER

## 2023-07-20 PROCEDURE — 6370000000 HC RX 637 (ALT 250 FOR IP): Performed by: NURSE PRACTITIONER

## 2023-07-20 RX ORDER — LIDOCAINE HYDROCHLORIDE 20 MG/ML
JELLY TOPICAL ONCE
OUTPATIENT
Start: 2023-07-20 | End: 2023-07-20

## 2023-07-20 RX ORDER — GENTAMICIN SULFATE 1 MG/G
OINTMENT TOPICAL ONCE
OUTPATIENT
Start: 2023-07-20 | End: 2023-07-20

## 2023-07-20 RX ORDER — GINSENG 100 MG
CAPSULE ORAL ONCE
OUTPATIENT
Start: 2023-07-20 | End: 2023-07-20

## 2023-07-20 RX ORDER — LIDOCAINE HYDROCHLORIDE 40 MG/ML
SOLUTION TOPICAL ONCE
OUTPATIENT
Start: 2023-07-20 | End: 2023-07-20

## 2023-07-20 RX ORDER — LIDOCAINE 50 MG/G
OINTMENT TOPICAL ONCE
OUTPATIENT
Start: 2023-07-20 | End: 2023-07-20

## 2023-07-20 RX ORDER — BACITRACIN ZINC AND POLYMYXIN B SULFATE 500; 1000 [USP'U]/G; [USP'U]/G
OINTMENT TOPICAL ONCE
OUTPATIENT
Start: 2023-07-20 | End: 2023-07-20

## 2023-07-20 RX ORDER — IBUPROFEN 200 MG
TABLET ORAL ONCE
OUTPATIENT
Start: 2023-07-20 | End: 2023-07-20

## 2023-07-20 RX ORDER — CLOBETASOL PROPIONATE 0.5 MG/G
OINTMENT TOPICAL ONCE
OUTPATIENT
Start: 2023-07-20 | End: 2023-07-20

## 2023-07-20 RX ORDER — BETAMETHASONE DIPROPIONATE 0.05 %
OINTMENT (GRAM) TOPICAL ONCE
OUTPATIENT
Start: 2023-07-20 | End: 2023-07-20

## 2023-07-20 RX ORDER — LIDOCAINE HYDROCHLORIDE 20 MG/ML
JELLY TOPICAL ONCE
Status: COMPLETED | OUTPATIENT
Start: 2023-07-20 | End: 2023-07-20

## 2023-07-20 RX ORDER — LIDOCAINE 40 MG/G
CREAM TOPICAL ONCE
OUTPATIENT
Start: 2023-07-20 | End: 2023-07-20

## 2023-07-20 RX ADMIN — LIDOCAINE HYDROCHLORIDE: 20 JELLY TOPICAL at 09:11

## 2023-07-20 NOTE — PLAN OF CARE
[place] Application   Below Knee    NAME:  Alvarado Eric [facility] OF BIRTH:  1945  MEDICAL RECORD NUMBER:  [id number]  DATE:  7/20/2023    Diogenes Killeen boot: Applied moisturizing agent to dry skin as needed. Appied primary and secondary dressing as ordered. Applied Unna roll from toes to knee overlapping each time. Applied ace wrap or coban from toes to below the knee. Instructed pt/family/caregiver to report excessive draining, loose bandage, wet dressing, severe pain or tingling in toes. Applied [place] dressing below the knee to left lower leg. Unna Boot(s) were applied per  Guidelines.      Electronically signed by Annita Cali RN on 7/20/2023 at 9:19 AM

## 2023-07-27 ENCOUNTER — HOSPITAL ENCOUNTER (OUTPATIENT)
Dept: WOUND CARE | Age: 78
Discharge: HOME | End: 2023-07-27
Payer: MEDICARE

## 2023-07-27 VITALS
RESPIRATION RATE: 18 BRPM | TEMPERATURE: 97.9 F | SYSTOLIC BLOOD PRESSURE: 151 MMHG | HEART RATE: 59 BPM | WEIGHT: 195 LBS | BODY MASS INDEX: 27.3 KG/M2 | HEIGHT: 71 IN | DIASTOLIC BLOOD PRESSURE: 77 MMHG

## 2023-07-27 DIAGNOSIS — I73.9 PVD (PERIPHERAL VASCULAR DISEASE) (HCC): ICD-10-CM

## 2023-07-27 DIAGNOSIS — L97.922 SKIN ULCER OF LEFT LOWER LEG WITH FAT LAYER EXPOSED (HCC): Primary | ICD-10-CM

## 2023-07-27 PROCEDURE — 6370000000 HC RX 637 (ALT 250 FOR IP): Performed by: NURSE PRACTITIONER

## 2023-07-27 PROCEDURE — 99212 OFFICE O/P EST SF 10 MIN: CPT | Performed by: NURSE PRACTITIONER

## 2023-07-27 PROCEDURE — 29580 STRAPPING UNNA BOOT: CPT

## 2023-07-27 RX ORDER — LIDOCAINE HYDROCHLORIDE 20 MG/ML
JELLY TOPICAL ONCE
OUTPATIENT
Start: 2023-07-27 | End: 2023-07-27

## 2023-07-27 RX ORDER — GINSENG 100 MG
CAPSULE ORAL ONCE
OUTPATIENT
Start: 2023-07-27 | End: 2023-07-27

## 2023-07-27 RX ORDER — LEVOFLOXACIN 500 MG/1
500 TABLET, FILM COATED ORAL DAILY
Qty: 7 TABLET | Refills: 0 | Status: SHIPPED | OUTPATIENT
Start: 2023-07-27 | End: 2023-08-03

## 2023-07-27 RX ORDER — IBUPROFEN 200 MG
TABLET ORAL ONCE
OUTPATIENT
Start: 2023-07-27 | End: 2023-07-27

## 2023-07-27 RX ORDER — LIDOCAINE HYDROCHLORIDE 20 MG/ML
JELLY TOPICAL ONCE
Status: COMPLETED | OUTPATIENT
Start: 2023-07-27 | End: 2023-07-27

## 2023-07-27 RX ORDER — BACITRACIN ZINC AND POLYMYXIN B SULFATE 500; 1000 [USP'U]/G; [USP'U]/G
OINTMENT TOPICAL ONCE
OUTPATIENT
Start: 2023-07-27 | End: 2023-07-27

## 2023-07-27 RX ORDER — GENTAMICIN SULFATE 1 MG/G
OINTMENT TOPICAL ONCE
OUTPATIENT
Start: 2023-07-27 | End: 2023-07-27

## 2023-07-27 RX ORDER — LIDOCAINE HYDROCHLORIDE 40 MG/ML
SOLUTION TOPICAL ONCE
OUTPATIENT
Start: 2023-07-27 | End: 2023-07-27

## 2023-07-27 RX ORDER — BETAMETHASONE DIPROPIONATE 0.05 %
OINTMENT (GRAM) TOPICAL ONCE
OUTPATIENT
Start: 2023-07-27 | End: 2023-07-27

## 2023-07-27 RX ORDER — CLOBETASOL PROPIONATE 0.5 MG/G
OINTMENT TOPICAL ONCE
OUTPATIENT
Start: 2023-07-27 | End: 2023-07-27

## 2023-07-27 RX ORDER — LIDOCAINE 40 MG/G
CREAM TOPICAL ONCE
OUTPATIENT
Start: 2023-07-27 | End: 2023-07-27

## 2023-07-27 RX ORDER — LIDOCAINE 50 MG/G
OINTMENT TOPICAL ONCE
OUTPATIENT
Start: 2023-07-27 | End: 2023-07-27

## 2023-07-27 RX ADMIN — LIDOCAINE HYDROCHLORIDE: 20 JELLY TOPICAL at 09:51

## 2023-07-27 NOTE — PROGRESS NOTES
[address]   Progress Note and Procedure Note      Heike Mckeon RECORD NUMBER:  [id number]  AGE: 69 y.o. GENDER: male  : 1945  EPISODE DATE:  2023    Subjective:     Chief Complaint   Patient presents with    Wound Check     Patient presents for recheck of left leg wounds      HISTORY of PRESENT ILLNESS HPI     Kimberly Tom is a 69 y.o. male who presents today for wound/ulcer evaluation. History of Wound Context: left leg wound follow up/eval and treat    Ulcer Identification:  Ulcer Type: venous  Contributing Factors: edema, venous stasis, and infection    Wound: N/A        PAST MEDICAL HISTORY        Diagnosis Date    Hepatitis A     Hyperlipidemia     mild    Hypertension     Panic attacks        PAST SURGICAL HISTORY    Past Surgical History:   Procedure Laterality Date    COLONOSCOPY  2004    int. hemorrhoids,     HIP ARTHROPLASTY Right     VASCULAR SURGERY      left leg       FAMILY HISTORY    History reviewed. No pertinent family history.     SOCIAL HISTORY    Social History     Tobacco Use    Smoking status: Never    Smokeless tobacco: Never   Substance Use Topics    Alcohol use: Yes    Drug use: No       ALLERGIES    Allergies   Allergen Reactions    Norco [Hydrocodone-Acetaminophen] Other (See Comments)     insomnia    Tetracyclines & Related        MEDICATIONS    Current Outpatient Medications on File Prior to Encounter   Medication Sig Dispense Refill    sulfamethoxazole-trimethoprim (BACTRIM DS;SEPTRA DS) 800-160 MG per tablet Take 1 tablet by mouth 2 times daily Started on 23      Multiple Vitamins-Minerals (THERAPEUTIC MULTIVITAMIN-MINERALS) tablet Take 1 tablet by mouth daily      cetirizine (ZYRTEC) 10 MG tablet Take 1 tablet by mouth daily      lisinopril (PRINIVIL;ZESTRIL) 10 MG tablet Take 1 tablet by mouth daily      allopurinol (ZYLOPRIM) 300 MG tablet Take 1 tablet by mouth daily 30 tablet 5    ALPRAZolam (XANAX)

## 2023-07-27 NOTE — PLAN OF CARE
[place] Application   Below Knee    NAME:  Alvarado Eric [facility] OF BIRTH:  1945  MEDICAL RECORD NUMBER:  [id number]  DATE:  7/27/2023    Verlidia Meyer boot: Applied moisturizing agent to dry skin as needed. Appied primary and secondary dressing as ordered. Applied Unna roll from toes to knee overlapping each time. Applied ace wrap or coban from toes to below the knee. Secured with tape and/or metal clips covered with tape. Instructed patient/caregiver to keep dressing dry and intact. DO NOT REMOVE DRESSING. Instructed pt/family/caregiver to report excessive draining, loose bandage, wet dressing, severe pain or tingling in toes. Applied [place] dressing below the knee to left lower leg. Unna Boot(s) were applied per  Guidelines.      Electronically signed by Roxie Alcantara RN on 7/27/2023 at 11:04 AM [facility]re [facility] and informed him that patient takes 1.5 tablets daily. Verbal understanding received.Dosage changed in pt med list as well.

## 2023-07-27 NOTE — DISCHARGE INSTRUCTIONS
[address] and Hyperbaric Oxygen Therapy   Physician Orders and Discharge Instructions  [address] [facility],[address] [place] [address], [address]  Telephone: [phone]    NAME:  Dede Richards OF BIRTH:  1945  MEDICAL RECORD NUMBER:  [id number]  DATE:  7/27/2023    Discharge condition: Stable    Discharge to: Home    Left via:Private automobile    Accompanied by:  self    ECF/HHA:     Left leg wound medial and anterior:   Wash with soap and water. Apply aqua gabrielle AG. Apply Zinc Coflex Unnaboot  from toes to knee. Change once weekly . Compression Therapy is an important part of your program of care. Compression makes it easier for your body to pump the blood from your legs back to your heart, helping alleviate a condition called chronic venous insufficiency. Chronic venous insufficiency is an underlying cause of your injury, and managing it properly will help you to heal.        Keep the bandage in place at all times unless you experience foot pain or numbness or coolness of the toes. Do not attempt to remove and reapply the bandage system. Give it time. Your bandage may feel tight at first. This is normal. Your bandage will become more comfortable as swelling goes down. Be active, as suggested by your healthcare provider. Daily walks or mild exercise encourages better blood flow to aid healing. Put your feet up when sitting for long periods of time. Keep the bandage dry. Wet compression bandages are more likely to slip down and/or cause damage to good skin. PRECAUTIONS For your safety, compression therapy should be used under the supervision of a healthcare professional. Contact your care provider if you experience any of the following:  Pain that does not go away with elevation. Discoloration or numbness of the toes. The bandage slipping out of place  Fluid leaking through the bandage.         Treatment Orders:   Protein rich diet (unless

## 2023-08-01 ENCOUNTER — HOSPITAL ENCOUNTER (OUTPATIENT)
Dept: WOUND CARE | Age: 78
Discharge: HOME | End: 2023-08-01
Payer: MEDICARE

## 2023-08-01 VITALS
DIASTOLIC BLOOD PRESSURE: 78 MMHG | RESPIRATION RATE: 18 BRPM | TEMPERATURE: 98.1 F | BODY MASS INDEX: 27.3 KG/M2 | HEIGHT: 71 IN | WEIGHT: 195 LBS | HEART RATE: 61 BPM | SYSTOLIC BLOOD PRESSURE: 168 MMHG

## 2023-08-01 DIAGNOSIS — L97.922 SKIN ULCER OF LEFT LOWER LEG WITH FAT LAYER EXPOSED (HCC): Primary | ICD-10-CM

## 2023-08-01 DIAGNOSIS — I73.9 PVD (PERIPHERAL VASCULAR DISEASE) (HCC): ICD-10-CM

## 2023-08-01 PROCEDURE — 97597 DBRDMT OPN WND 1ST 20 CM/<: CPT

## 2023-08-01 PROCEDURE — 6370000000 HC RX 637 (ALT 250 FOR IP): Performed by: NURSE PRACTITIONER

## 2023-08-01 PROCEDURE — 97597 DBRDMT OPN WND 1ST 20 CM/<: CPT | Performed by: NURSE PRACTITIONER

## 2023-08-01 RX ORDER — LIDOCAINE HYDROCHLORIDE 20 MG/ML
JELLY TOPICAL ONCE
OUTPATIENT
Start: 2023-08-01 | End: 2023-08-01

## 2023-08-01 RX ORDER — GINSENG 100 MG
CAPSULE ORAL ONCE
OUTPATIENT
Start: 2023-08-01 | End: 2023-08-01

## 2023-08-01 RX ORDER — GENTAMICIN SULFATE 1 MG/G
OINTMENT TOPICAL ONCE
OUTPATIENT
Start: 2023-08-01 | End: 2023-08-01

## 2023-08-01 RX ORDER — BETAMETHASONE DIPROPIONATE 0.05 %
OINTMENT (GRAM) TOPICAL ONCE
OUTPATIENT
Start: 2023-08-01 | End: 2023-08-01

## 2023-08-01 RX ORDER — LIDOCAINE 50 MG/G
OINTMENT TOPICAL ONCE
OUTPATIENT
Start: 2023-08-01 | End: 2023-08-01

## 2023-08-01 RX ORDER — LIDOCAINE 40 MG/G
CREAM TOPICAL ONCE
OUTPATIENT
Start: 2023-08-01 | End: 2023-08-01

## 2023-08-01 RX ORDER — IBUPROFEN 200 MG
TABLET ORAL ONCE
OUTPATIENT
Start: 2023-08-01 | End: 2023-08-01

## 2023-08-01 RX ORDER — LIDOCAINE HYDROCHLORIDE 20 MG/ML
JELLY TOPICAL ONCE
Status: COMPLETED | OUTPATIENT
Start: 2023-08-01 | End: 2023-08-01

## 2023-08-01 RX ORDER — CLOBETASOL PROPIONATE 0.5 MG/G
OINTMENT TOPICAL ONCE
OUTPATIENT
Start: 2023-08-01 | End: 2023-08-01

## 2023-08-01 RX ORDER — LIDOCAINE HYDROCHLORIDE 40 MG/ML
SOLUTION TOPICAL ONCE
OUTPATIENT
Start: 2023-08-01 | End: 2023-08-01

## 2023-08-01 RX ORDER — BACITRACIN ZINC AND POLYMYXIN B SULFATE 500; 1000 [USP'U]/G; [USP'U]/G
OINTMENT TOPICAL ONCE
OUTPATIENT
Start: 2023-08-01 | End: 2023-08-01

## 2023-08-01 RX ADMIN — LIDOCAINE HYDROCHLORIDE: 20 JELLY TOPICAL at 10:20

## 2023-08-01 NOTE — PLAN OF CARE
[place] Application   Below Knee    NAME:  Alvarado Eric [facility] OF BIRTH:  1945  MEDICAL RECORD NUMBER:  [id number]  DATE:  8/1/2023    Wendy Caceres boot: Applied moisturizing agent to dry skin as needed. Appied primary and secondary dressing as ordered. Applied Unna roll from toes to knee overlapping each time. Applied ace wrap or coban from toes to below the knee. Instructed patient/caregiver to keep dressing dry and intact. DO NOT REMOVE DRESSING. Instructed pt/family/caregiver to report excessive draining, loose bandage, wet dressing, severe pain or tingling in toes. Applied [place] dressing below the knee to left lower leg. Unna Boot(s) were applied per  Guidelines.      Electronically signed by Brown Newell RN on 8/1/2023 at 10:53 AM

## 2023-08-01 NOTE — PROGRESS NOTES
Ends___ O'Clock 0 08/01/23 1025   Undermining Maxium Distance (cm) 0 08/01/23 1025   Wound Assessment Boardman/red;Slough 08/01/23 1025   Drainage Amount Moderate 08/01/23 1025   Drainage Description Serosanguinous 08/01/23 1025   Odor Mild 08/01/23 1025   Tammy-wound Assessment Blanchable erythema; Intact 08/01/23 1025   Margins Attached edges 08/01/23 1025   Wound Thickness Description not for Pressure Injury Full thickness 08/01/23 1025   Number of days: 12           Procedure Note  Indications:  Based on my examination of this patient's wound(s)/ulcer(s) today, debridement is required to promote healing and evaluate the wound base. Performed by: WAYNE Omalley CNP    Consent obtained:  Yes    Time out taken:  Yes    Pain Control: Anesthetic  Anesthetic: 2% Lidocaine Gel Topical       Debridement:Non-excisional Debridement    Using curette the wound(s)/ulcer(s) was/were sharply debrided down through and including the removal of epidermis and dermis. Devitalized Tissue Debrided:  fibrin, biofilm, slough, and exudate    Pre Debridement Measurements:  Are located in the Wound/Ulcer Documentation Flow Sheet    Wound/Ulcer #: 1 and 3    Post Debridement Measurements:  Wound/Ulcer Descriptions are Pre Debridement except measurements:      Percent of Wound/Ulcer Debrided: 100%    Total Surface Area Debrided:  4.89 sq cm     Estimated Blood Loss:  Minimal    Hemostasis Achieved:  by pressure    Procedural Pain:  0  / 10     Post Procedural Pain:  0 / 10     Response to treatment:  Well tolerated by patient.          Diabetic/Pressure/Non Pressure Ulcers only:  Ulcer: N/A            Plan:     Problem List Items Addressed This Visit          Circulatory    PVD (peripheral vascular disease) ([address])    Relevant Orders    Initiate Outpatient Wound Care Protocol       Other    * (Principal) Skin ulcer of left lower leg with fat layer exposed ([address]) - Primary    Relevant Orders    Initiate Outpatient Wound Care

## 2023-08-08 ENCOUNTER — HOSPITAL ENCOUNTER (OUTPATIENT)
Dept: WOUND CARE | Age: 78
Discharge: HOME | End: 2023-08-08
Payer: MEDICARE

## 2023-08-08 VITALS
SYSTOLIC BLOOD PRESSURE: 132 MMHG | DIASTOLIC BLOOD PRESSURE: 81 MMHG | HEIGHT: 71 IN | WEIGHT: 195 LBS | BODY MASS INDEX: 27.3 KG/M2 | RESPIRATION RATE: 18 BRPM | HEART RATE: 66 BPM | TEMPERATURE: 98.7 F

## 2023-08-08 DIAGNOSIS — L97.922 SKIN ULCER OF LEFT LOWER LEG WITH FAT LAYER EXPOSED (HCC): Primary | ICD-10-CM

## 2023-08-08 DIAGNOSIS — I73.9 PVD (PERIPHERAL VASCULAR DISEASE) (HCC): ICD-10-CM

## 2023-08-08 PROCEDURE — 6370000000 HC RX 637 (ALT 250 FOR IP): Performed by: NURSE PRACTITIONER

## 2023-08-08 PROCEDURE — 97597 DBRDMT OPN WND 1ST 20 CM/<: CPT

## 2023-08-08 RX ORDER — LIDOCAINE HYDROCHLORIDE 20 MG/ML
JELLY TOPICAL ONCE
Status: COMPLETED | OUTPATIENT
Start: 2023-08-08 | End: 2023-08-08

## 2023-08-08 RX ORDER — GENTAMICIN SULFATE 1 MG/G
OINTMENT TOPICAL ONCE
OUTPATIENT
Start: 2023-08-08 | End: 2023-08-08

## 2023-08-08 RX ORDER — LIDOCAINE 50 MG/G
OINTMENT TOPICAL ONCE
OUTPATIENT
Start: 2023-08-08 | End: 2023-08-08

## 2023-08-08 RX ORDER — CLOBETASOL PROPIONATE 0.5 MG/G
OINTMENT TOPICAL ONCE
OUTPATIENT
Start: 2023-08-08 | End: 2023-08-08

## 2023-08-08 RX ORDER — IBUPROFEN 200 MG
TABLET ORAL ONCE
OUTPATIENT
Start: 2023-08-08 | End: 2023-08-08

## 2023-08-08 RX ORDER — LIDOCAINE HYDROCHLORIDE 40 MG/ML
SOLUTION TOPICAL ONCE
OUTPATIENT
Start: 2023-08-08 | End: 2023-08-08

## 2023-08-08 RX ORDER — LIDOCAINE HYDROCHLORIDE 20 MG/ML
JELLY TOPICAL ONCE
OUTPATIENT
Start: 2023-08-08 | End: 2023-08-08

## 2023-08-08 RX ORDER — LIDOCAINE 40 MG/G
CREAM TOPICAL ONCE
OUTPATIENT
Start: 2023-08-08 | End: 2023-08-08

## 2023-08-08 RX ORDER — GINSENG 100 MG
CAPSULE ORAL ONCE
OUTPATIENT
Start: 2023-08-08 | End: 2023-08-08

## 2023-08-08 RX ORDER — BETAMETHASONE DIPROPIONATE 0.05 %
OINTMENT (GRAM) TOPICAL ONCE
OUTPATIENT
Start: 2023-08-08 | End: 2023-08-08

## 2023-08-08 RX ORDER — BACITRACIN ZINC AND POLYMYXIN B SULFATE 500; 1000 [USP'U]/G; [USP'U]/G
OINTMENT TOPICAL ONCE
OUTPATIENT
Start: 2023-08-08 | End: 2023-08-08

## 2023-08-08 RX ADMIN — LIDOCAINE HYDROCHLORIDE: 20 JELLY TOPICAL at 10:16

## 2023-08-08 NOTE — PROGRESS NOTES
benefit from HBO Therapy: No    Written patient dismissal instructions given to patient and signed by patient or POA. Mr. Coral Herron is healing well. Follow up in 1 week.   Electronically signed by WAYNE Julio CNP on 8/8/2023 at 11:22 AM

## 2023-08-14 NOTE — DISCHARGE INSTRUCTIONS
leaking through the bandage. Treatment Orders:   Protein rich diet (unless restricted by your physician); Multivitamin daily; Elevate legs above the level of your heart when sitting 3-4 times daily for at least one hour each time, avoid standing for long periods of time. [facility] follow up visit ___________1 week with Kasia__________________  (Please note your next appointment above and if you are unable to keep, kindly give a 24 hour notice. Thank you.)          If you experience any of the following, please call the [facility] during business hours:    * Increase in Pain  * Temperature over 101  * Increase in drainage from your wound  * Drainage with a foul odor  * Bleeding  * Increase in swelling  * Need for compression bandage changes due to slippage, breakthrough drainage. If you need medical attention outside of the business hours of the [facility] please contact your PCP or go to the nearest emergency room.

## 2023-08-15 ENCOUNTER — HOSPITAL ENCOUNTER (OUTPATIENT)
Dept: WOUND CARE | Age: 78
Discharge: HOME | End: 2023-08-15
Payer: MEDICARE

## 2023-08-15 VITALS
WEIGHT: 195 LBS | BODY MASS INDEX: 27.3 KG/M2 | RESPIRATION RATE: 18 BRPM | TEMPERATURE: 97.7 F | HEART RATE: 58 BPM | HEIGHT: 71 IN | SYSTOLIC BLOOD PRESSURE: 150 MMHG | DIASTOLIC BLOOD PRESSURE: 77 MMHG

## 2023-08-15 DIAGNOSIS — L97.922 SKIN ULCER OF LEFT LOWER LEG WITH FAT LAYER EXPOSED (HCC): Primary | ICD-10-CM

## 2023-08-15 DIAGNOSIS — I73.9 PVD (PERIPHERAL VASCULAR DISEASE) (HCC): ICD-10-CM

## 2023-08-15 PROCEDURE — 6370000000 HC RX 637 (ALT 250 FOR IP): Performed by: NURSE PRACTITIONER

## 2023-08-15 PROCEDURE — 97597 DBRDMT OPN WND 1ST 20 CM/<: CPT

## 2023-08-15 RX ORDER — CLOBETASOL PROPIONATE 0.5 MG/G
OINTMENT TOPICAL ONCE
OUTPATIENT
Start: 2023-08-15 | End: 2023-08-15

## 2023-08-15 RX ORDER — BACITRACIN ZINC AND POLYMYXIN B SULFATE 500; 1000 [USP'U]/G; [USP'U]/G
OINTMENT TOPICAL ONCE
OUTPATIENT
Start: 2023-08-15 | End: 2023-08-15

## 2023-08-15 RX ORDER — LIDOCAINE 40 MG/G
CREAM TOPICAL ONCE
OUTPATIENT
Start: 2023-08-15 | End: 2023-08-15

## 2023-08-15 RX ORDER — LIDOCAINE HYDROCHLORIDE 40 MG/ML
SOLUTION TOPICAL ONCE
OUTPATIENT
Start: 2023-08-15 | End: 2023-08-15

## 2023-08-15 RX ORDER — BETAMETHASONE DIPROPIONATE 0.05 %
OINTMENT (GRAM) TOPICAL ONCE
OUTPATIENT
Start: 2023-08-15 | End: 2023-08-15

## 2023-08-15 RX ORDER — IBUPROFEN 200 MG
TABLET ORAL ONCE
OUTPATIENT
Start: 2023-08-15 | End: 2023-08-15

## 2023-08-15 RX ORDER — GINSENG 100 MG
CAPSULE ORAL ONCE
OUTPATIENT
Start: 2023-08-15 | End: 2023-08-15

## 2023-08-15 RX ORDER — LIDOCAINE 50 MG/G
OINTMENT TOPICAL ONCE
OUTPATIENT
Start: 2023-08-15 | End: 2023-08-15

## 2023-08-15 RX ORDER — GENTAMICIN SULFATE 1 MG/G
OINTMENT TOPICAL ONCE
OUTPATIENT
Start: 2023-08-15 | End: 2023-08-15

## 2023-08-15 RX ORDER — LIDOCAINE HYDROCHLORIDE 20 MG/ML
JELLY TOPICAL ONCE
Status: COMPLETED | OUTPATIENT
Start: 2023-08-15 | End: 2023-08-15

## 2023-08-15 RX ORDER — LIDOCAINE HYDROCHLORIDE 20 MG/ML
JELLY TOPICAL ONCE
OUTPATIENT
Start: 2023-08-15 | End: 2023-08-15

## 2023-08-15 RX ORDER — LISINOPRIL 20 MG/1
20 TABLET ORAL DAILY
COMMUNITY

## 2023-08-15 RX ADMIN — LIDOCAINE HYDROCHLORIDE: 20 JELLY TOPICAL at 10:17

## 2023-08-15 NOTE — PROGRESS NOTES
[address]   Progress Note and Procedure Note      Heike Mckeon RECORD NUMBER:  [id number]  AGE: 69 y.o. GENDER: male  : 1945  EPISODE DATE:  8/15/2023    Subjective:     Chief Complaint   Patient presents with    Wound Check      HISTORY of PRESENT ILLNESS HPI     Gutierrez Cooney is a 69 y.o. male who presents today for wound/ulcer evaluation. History of Wound Context: left leg wound follow up/eval and treat    Ulcer Identification:  Ulcer Type: venous  Contributing Factors: edema    Wound: N/A        PAST MEDICAL HISTORY        Diagnosis Date    Hepatitis A     Hyperlipidemia     mild    Hypertension     Panic attacks        PAST SURGICAL HISTORY    Past Surgical History:   Procedure Laterality Date    COLONOSCOPY  2004    int. hemorrhoids,     HIP ARTHROPLASTY Right     VASCULAR SURGERY      left leg       FAMILY HISTORY    History reviewed. No pertinent family history.     SOCIAL HISTORY    Social History     Tobacco Use    Smoking status: Never    Smokeless tobacco: Never   Substance Use Topics    Alcohol use: Yes    Drug use: No       ALLERGIES    Allergies   Allergen Reactions    Norco [Hydrocodone-Acetaminophen] Other (See Comments)     insomnia    Tetracyclines & Related        MEDICATIONS    Current Outpatient Medications on File Prior to Encounter   Medication Sig Dispense Refill    lisinopril (PRINIVIL;ZESTRIL) 20 MG tablet Take 1 tablet by mouth daily      Multiple Vitamins-Minerals (THERAPEUTIC MULTIVITAMIN-MINERALS) tablet Take 1 tablet by mouth daily      cetirizine (ZYRTEC) 10 MG tablet Take 1 tablet by mouth daily      allopurinol (ZYLOPRIM) 300 MG tablet Take 1 tablet by mouth daily 30 tablet 5    ALPRAZolam (XANAX) 0.5 MG tablet Take 1 tablet by mouth 3 times daily as needed for Anxiety 60 tablet 0    vardenafil (LEVITRA) 20 MG tablet Take 1 tablet by mouth as needed for Erectile Dysfunction (take 30mins to one hour before sex) 21

## 2023-08-15 NOTE — PLAN OF CARE
[place] Application   Below Knee    NAME:  Alvarado Eric [facility] OF BIRTH:  1945  MEDICAL RECORD NUMBER:  [id number]  DATE:  8/15/2023    Diogenes Carmen boot: Applied moisturizing agent to dry skin as needed. Appied primary and secondary dressing as ordered. Applied Unna roll from toes to knee overlapping each time. Applied ace wrap or coban from toes to below the knee. Instructed patient/caregiver to keep dressing dry and intact. DO NOT REMOVE DRESSING. Instructed pt/family/caregiver to report excessive draining, loose bandage, wet dressing, severe pain or tingling in toes. Applied [place] dressing below the knee to left lower leg. Unna Boot(s) were applied per  Guidelines.      Electronically signed by Annita Cali RN on 8/15/2023 at 10:52 AM

## 2023-08-22 ENCOUNTER — HOSPITAL ENCOUNTER (OUTPATIENT)
Dept: WOUND CARE | Age: 78
Discharge: HOME | End: 2023-08-22
Payer: MEDICARE

## 2023-08-22 VITALS
HEIGHT: 71 IN | WEIGHT: 195 LBS | HEART RATE: 64 BPM | SYSTOLIC BLOOD PRESSURE: 153 MMHG | RESPIRATION RATE: 18 BRPM | TEMPERATURE: 97.8 F | DIASTOLIC BLOOD PRESSURE: 72 MMHG | BODY MASS INDEX: 27.3 KG/M2

## 2023-08-22 DIAGNOSIS — L97.922 SKIN ULCER OF LEFT LOWER LEG WITH FAT LAYER EXPOSED (HCC): Primary | ICD-10-CM

## 2023-08-22 DIAGNOSIS — I73.9 PVD (PERIPHERAL VASCULAR DISEASE) (HCC): ICD-10-CM

## 2023-08-22 PROCEDURE — 6370000000 HC RX 637 (ALT 250 FOR IP): Performed by: NURSE PRACTITIONER

## 2023-08-22 PROCEDURE — 97597 DBRDMT OPN WND 1ST 20 CM/<: CPT

## 2023-08-22 RX ORDER — LIDOCAINE HYDROCHLORIDE 40 MG/ML
SOLUTION TOPICAL ONCE
OUTPATIENT
Start: 2023-08-22 | End: 2023-08-22

## 2023-08-22 RX ORDER — LIDOCAINE HYDROCHLORIDE 20 MG/ML
JELLY TOPICAL ONCE
Status: COMPLETED | OUTPATIENT
Start: 2023-08-22 | End: 2023-08-22

## 2023-08-22 RX ORDER — BACITRACIN ZINC AND POLYMYXIN B SULFATE 500; 1000 [USP'U]/G; [USP'U]/G
OINTMENT TOPICAL ONCE
OUTPATIENT
Start: 2023-08-22 | End: 2023-08-22

## 2023-08-22 RX ORDER — BETAMETHASONE DIPROPIONATE 0.05 %
OINTMENT (GRAM) TOPICAL ONCE
OUTPATIENT
Start: 2023-08-22 | End: 2023-08-22

## 2023-08-22 RX ORDER — GINSENG 100 MG
CAPSULE ORAL ONCE
OUTPATIENT
Start: 2023-08-22 | End: 2023-08-22

## 2023-08-22 RX ORDER — CLOBETASOL PROPIONATE 0.5 MG/G
OINTMENT TOPICAL ONCE
OUTPATIENT
Start: 2023-08-22 | End: 2023-08-22

## 2023-08-22 RX ORDER — LIDOCAINE HYDROCHLORIDE 20 MG/ML
JELLY TOPICAL ONCE
OUTPATIENT
Start: 2023-08-22 | End: 2023-08-22

## 2023-08-22 RX ORDER — GENTAMICIN SULFATE 1 MG/G
OINTMENT TOPICAL ONCE
OUTPATIENT
Start: 2023-08-22 | End: 2023-08-22

## 2023-08-22 RX ORDER — LIDOCAINE 50 MG/G
OINTMENT TOPICAL ONCE
OUTPATIENT
Start: 2023-08-22 | End: 2023-08-22

## 2023-08-22 RX ORDER — LIDOCAINE 40 MG/G
CREAM TOPICAL ONCE
OUTPATIENT
Start: 2023-08-22 | End: 2023-08-22

## 2023-08-22 RX ORDER — IBUPROFEN 200 MG
TABLET ORAL ONCE
OUTPATIENT
Start: 2023-08-22 | End: 2023-08-22

## 2023-08-22 RX ADMIN — LIDOCAINE HYDROCHLORIDE: 20 JELLY TOPICAL at 09:28

## 2023-08-22 NOTE — PROGRESS NOTES
Ulcers only:  Ulcer: N/A            Plan:     Problem List Items Addressed This Visit          Circulatory    PVD (peripheral vascular disease) ([address])    Relevant Orders    Initiate Outpatient Wound Care Protocol       Other    * (Principal) Skin ulcer of left lower leg with fat layer exposed ([address]) - Primary    Relevant Orders    Initiate Outpatient Wound Care Protocol       Treatment Note please see attached Discharge Instructions    In my professional opinion this patient would benefit from HBO Therapy: No    Written patient dismissal instructions given to patient and signed by patient or POA. Mr. Real Srinivasan is healing well continue unna boot with xeroform. Follow up in 1 week.   Electronically signed by WAYNE Ding CNP on 8/22/2023 at 10:05 AM

## 2023-08-22 NOTE — PLAN OF CARE
[place] Application   Below Knee    NAME:  Alvarado [facility] OF BIRTH:  1945  MEDICAL RECORD NUMBER:  [id number]  DATE:  8/22/2023    Sonal Contreras boot: Applied moisturizing agent to dry skin as needed. Appied primary and secondary dressing as ordered. Applied Unna roll from toes to knee overlapping each time. Applied ace wrap or coban from toes to below the knee. Instructed patient/caregiver to keep dressing dry and intact. DO NOT REMOVE DRESSING. Instructed pt/family/caregiver to report excessive draining, loose bandage, wet dressing, severe pain or tingling in toes. Applied [place] dressing below the knee to left lower leg. Unna Boot(s) were applied per  Guidelines.      Electronically signed by Letitia Keita RN on 8/22/2023 at 9:56 AM
Problem: Discharge Planning  Goal: Discharge to home or other facility with appropriate resources  Outcome: Progressing     Problem: Discharge Planning  Goal: Discharge to home or other facility with appropriate resources  Outcome: Progressing     Problem: Wound:  Goal: Will show signs of wound healing; wound closure and no evidence of infection  Description: Will show signs of wound healing; wound closure and no evidence of infection  Outcome: Progressing     Problem: Venous:  Goal: Signs of wound healing will improve  Description: Signs of wound healing will improve  Outcome: Progressing     Problem: Compression therapy:  Goal: Will be free from complications associated with compression therapy  Description: Will be free from complications associated with compression therapy  Outcome: Progressing     Problem: Weight control:  Goal: Ability to maintain an optimal weight for height and age will be supported  Description: Ability to maintain an optimal weight for height and age will be supported  Outcome: Progressing     Problem: Falls - Risk of:  Goal: Will remain free from falls  Description: Will remain free from falls  Outcome: Progressing
0.9%NaCl at 75ml/hr (stop after one liter order noted)

## 2023-08-30 ENCOUNTER — HOSPITAL ENCOUNTER (OUTPATIENT)
Dept: WOUND CARE | Age: 78
Discharge: HOME | End: 2023-08-30
Payer: MEDICARE

## 2023-08-30 VITALS
WEIGHT: 195 LBS | HEIGHT: 71 IN | HEART RATE: 64 BPM | TEMPERATURE: 98 F | DIASTOLIC BLOOD PRESSURE: 78 MMHG | BODY MASS INDEX: 27.3 KG/M2 | RESPIRATION RATE: 18 BRPM | SYSTOLIC BLOOD PRESSURE: 160 MMHG

## 2023-08-30 DIAGNOSIS — L97.922 SKIN ULCER OF LEFT LOWER LEG WITH FAT LAYER EXPOSED (HCC): Primary | ICD-10-CM

## 2023-08-30 DIAGNOSIS — I73.9 PVD (PERIPHERAL VASCULAR DISEASE) (HCC): ICD-10-CM

## 2023-08-30 PROCEDURE — 99212 OFFICE O/P EST SF 10 MIN: CPT | Performed by: NURSE PRACTITIONER

## 2023-08-30 PROCEDURE — 29580 STRAPPING UNNA BOOT: CPT

## 2023-09-01 ENCOUNTER — HOSPITAL ENCOUNTER (OUTPATIENT)
Dept: CT IMAGING | Age: 78
Discharge: HOME | End: 2023-09-01
Payer: MEDICARE

## 2023-09-01 DIAGNOSIS — R10.84 GENERALIZED ABDOMINAL PAIN: ICD-10-CM

## 2023-09-01 DIAGNOSIS — R11.0 NAUSEA: ICD-10-CM

## 2023-09-01 LAB — CREAT SERPL-MCNC: 1.8 MG/DL (ref 0.3–1.3)

## 2023-09-01 PROCEDURE — 74170 CT ABD WO CNTRST FLWD CNTRST: CPT

## 2023-09-01 PROCEDURE — 82565 ASSAY OF CREATININE: CPT

## 2023-09-01 PROCEDURE — 6360000004 HC RX CONTRAST MEDICATION: Performed by: NURSE PRACTITIONER

## 2023-09-01 RX ADMIN — IOPAMIDOL 50 ML: 755 INJECTION, SOLUTION INTRAVENOUS at 11:16

## 2023-09-06 ENCOUNTER — HOSPITAL ENCOUNTER (OUTPATIENT)
Dept: WOUND CARE | Age: 78
Discharge: HOME | End: 2023-09-06
Payer: MEDICARE

## 2023-09-06 VITALS
RESPIRATION RATE: 18 BRPM | HEIGHT: 71 IN | HEART RATE: 64 BPM | BODY MASS INDEX: 27.3 KG/M2 | WEIGHT: 195 LBS | TEMPERATURE: 98 F | DIASTOLIC BLOOD PRESSURE: 78 MMHG | SYSTOLIC BLOOD PRESSURE: 160 MMHG

## 2023-09-06 DIAGNOSIS — L97.922 SKIN ULCER OF LEFT LOWER LEG WITH FAT LAYER EXPOSED (HCC): Primary | ICD-10-CM

## 2023-09-06 DIAGNOSIS — I73.9 PVD (PERIPHERAL VASCULAR DISEASE) (HCC): ICD-10-CM

## 2023-09-06 PROCEDURE — 99212 OFFICE O/P EST SF 10 MIN: CPT | Performed by: NURSE PRACTITIONER

## 2023-09-06 NOTE — DISCHARGE INSTRUCTIONS
[address] and Hyperbaric Oxygen Therapy   Physician Orders and Discharge Instructions  [address] [facility],[address] [place] [address], [address]  Telephone: [phone]    NAME:  Gil Martins OF BIRTH:  1945  MEDICAL RECORD NUMBER:  [id number]  DATE:  9/6/2023    Discharge condition: Stable    Discharge to: Home    Left via:Private automobile    Accompanied by:  self    ECF/HHA: [facility]    Dressing Orders:  Left leg wound: soap and water wash, apply hydrofera blue silicone bandage to the wound bed, change every other day    Treatment Orders:  Protein rich diet (unless restricted by your physician); Multivitamin daily; Elevate legs above the level of your heart when sitting 3-4 times daily for at least one hour each time, avoid standing for long periods of time. [facility] follow up visit ___2 week with Kasia__________________________  (Please note your next appointment above and if you are unable to keep, kindly give a 24 hour notice. Thank you.)          If you experience any of the following, please call the [facility] during business hours:    * Increase in Pain  * Temperature over 101  * Increase in drainage from your wound  * Drainage with a foul odor  * Bleeding  * Increase in swelling  * Need for compression bandage changes due to slippage, breakthrough drainage. If you need medical attention outside of the business hours of the [email or web address] please contact your PCP or go to the nearest emergency room.

## 2023-09-06 NOTE — PROGRESS NOTES
[address]   Progress Note and Procedure Note      Heike Mckeon RECORD NUMBER:  [id number]  AGE: 69 y.o. GENDER: male  : 1945  EPISODE DATE:  2023    Subjective:     Chief Complaint   Patient presents with    Wound Check     Left leg wounds         HISTORY of PRESENT ILLNESS HPI     Alpesh Sharma is a 69 y.o. male who presents today for wound/ulcer evaluation. History of Wound Context: left leg wound follow up/eval and treat    Ulcer Identification:  Ulcer Type: venous  Contributing Factors: edema, chronic pressure, and shear force    Wound: N/A        PAST MEDICAL HISTORY        Diagnosis Date    Hepatitis A     Hyperlipidemia     mild    Hypertension     Panic attacks        PAST SURGICAL HISTORY    Past Surgical History:   Procedure Laterality Date    COLONOSCOPY  2004    int. hemorrhoids,     HIP ARTHROPLASTY Right     VASCULAR SURGERY      left leg       FAMILY HISTORY    History reviewed. No pertinent family history.     SOCIAL HISTORY    Social History     Tobacco Use    Smoking status: Never    Smokeless tobacco: Never   Substance Use Topics    Alcohol use: Yes    Drug use: No       ALLERGIES    Allergies   Allergen Reactions    Norco [Hydrocodone-Acetaminophen] Other (See Comments)     insomnia    Tetracyclines & Related        MEDICATIONS    Current Outpatient Medications on File Prior to Encounter   Medication Sig Dispense Refill    lisinopril (PRINIVIL;ZESTRIL) 20 MG tablet Take 1 tablet by mouth daily      Multiple Vitamins-Minerals (THERAPEUTIC MULTIVITAMIN-MINERALS) tablet Take 1 tablet by mouth daily      cetirizine (ZYRTEC) 10 MG tablet Take 1 tablet by mouth daily      allopurinol (ZYLOPRIM) 300 MG tablet Take 1 tablet by mouth daily 30 tablet 5    ALPRAZolam (XANAX) 0.5 MG tablet Take 1 tablet by mouth 3 times daily as needed for Anxiety 60 tablet 0    vardenafil (LEVITRA) 20 MG tablet Take 1 tablet by mouth as needed for Erectile

## 2023-09-06 NOTE — HOME CARE
[address] [address] [address]:     [facility] 2[address] [facility] 3033 [facility], [address]  p: [phone] f: [phone]     Ordering Center:     [address] [place] [address] [address] [place] [address] [phone]  WOUND CARE Dept: [address] [facility] [phone]    Patient Information:      Yennifer Palomo  [address]  [facility] [address]   [phone]   : 1945  AGE: 69 y.o. GENDER: male   EPISODE DATE: 2023    Insurance:      PRIMARY INSURANCE:  Plan: Nga [place] ESSENTIAL/PLUS  Coverage: BCBS MEDICARE  Effective Date: 2015  Group Number: [unfilled]  Subscriber Number: [id number] - (Medicare Managed)    Payer/Plan Subscr  Sex Relation Sub. Ins. ID Effective Group Num   1.  BCBS MEDICARE* Yennifer Palomo 1945 Male Self [id number] 1/1/15 [id number]                                   [id number]       Patient Wound Information:      Problem List Items Addressed This Visit          Circulatory    PVD (peripheral vascular disease) ([address])       Other    * (Principal) Skin ulcer of left lower leg with fat layer exposed ([address]) - Primary       WOUNDS REQUIRING DRESSING SUPPLIES:     Wound 23 Pretibial Left #2 left medial (venous) (Active)   Wound Image   23 1534   Wound Etiology Venous 23 1534   Dressing Status Old drainage noted 23 1534   Wound Cleansed Soap and water 23 1534   Dressing/Treatment Hydrofera blue 23 1549   Dressing Change Due 23 0934   Wound Length (cm) 5 cm 23 1534   Wound Width (cm) 4 cm 23 1534   Wound Depth (cm) 0.1 cm 23 1534   Wound Surface Area (cm^2) 20 cm^2 23 1534   Change in Wound Size % (l*w) -400 23 1534   Wound Volume (cm^3) 2 cm^3 234   Wound Healing % -400 23   Post-Procedure Length (cm) 1 cm 23   Post-Procedure Width (cm) 0.8 cm 23   Post-Procedure

## 2023-09-18 NOTE — DISCHARGE INSTRUCTIONS
[address] and Hyperbaric Oxygen Therapy   Physician Orders and Discharge Instructions  [address] [facility],[address] [place] [address] Blvd, [address]  Telephone: [phone]    NAME:  Olga Lidia Friedman OF BIRTH:  1945  MEDICAL RECORD NUMBER:  [id number]  DATE:  9/21/23    Discharge condition: Stable    Discharge to: Home    Left via:Private automobile    Accompanied by:  self    ECF/HHA: [place]    Biopsy done today in clinic  6 inch Ace wrap applied to LLE today in clinic     Dressing Orders:  Left leg wound: soap and water wash, apply hydrofera blue silicone bandage to the wound bed, change every other day. Treatment Orders:  Protein rich diet (unless restricted by your physician); Multivitamin daily; Elevate legs above the level of your heart when sitting 3-4 times daily for at least one hour each time, avoid standing for long periods of time. [facility] follow up visit ___2 week with Kasia__________________  (Please note your next appointment above and if you are unable to keep, kindly give a 24 hour notice. Thank you.)          If you experience any of the following, please call the [facility] during business hours:    * Increase in Pain  * Temperature over 101  * Increase in drainage from your wound  * Drainage with a foul odor  * Bleeding  * Increase in swelling  * Need for compression bandage changes due to slippage, breakthrough drainage. If you need medical attention outside of the business hours of the [facility] please contact your PCP or go to the nearest emergency room.

## 2023-09-21 ENCOUNTER — HOSPITAL ENCOUNTER (OUTPATIENT)
Dept: WOUND CARE | Age: 78
Discharge: HOME | End: 2023-09-21
Payer: MEDICARE

## 2023-09-21 ENCOUNTER — HOSPITAL ENCOUNTER (OUTPATIENT)
Age: 78
Setting detail: SPECIMEN
Discharge: HOME | End: 2023-09-21

## 2023-09-21 VITALS
SYSTOLIC BLOOD PRESSURE: 185 MMHG | HEART RATE: 64 BPM | HEIGHT: 71 IN | DIASTOLIC BLOOD PRESSURE: 77 MMHG | WEIGHT: 195 LBS | BODY MASS INDEX: 27.3 KG/M2 | TEMPERATURE: 98.5 F | RESPIRATION RATE: 18 BRPM

## 2023-09-21 DIAGNOSIS — I73.9 PVD (PERIPHERAL VASCULAR DISEASE) (HCC): ICD-10-CM

## 2023-09-21 DIAGNOSIS — L97.922 SKIN ULCER OF LEFT LOWER LEG WITH FAT LAYER EXPOSED (HCC): Primary | ICD-10-CM

## 2023-09-21 PROCEDURE — 11104 PUNCH BX SKIN SINGLE LESION: CPT

## 2023-09-21 PROCEDURE — 2500000003 HC RX 250 WO HCPCS: Performed by: NURSE PRACTITIONER

## 2023-09-21 RX ORDER — LIDOCAINE HYDROCHLORIDE 40 MG/ML
SOLUTION TOPICAL ONCE
OUTPATIENT
Start: 2023-09-21 | End: 2023-09-21

## 2023-09-21 RX ORDER — GINSENG 100 MG
CAPSULE ORAL ONCE
OUTPATIENT
Start: 2023-09-21 | End: 2023-09-21

## 2023-09-21 RX ORDER — LIDOCAINE HYDROCHLORIDE 20 MG/ML
JELLY TOPICAL ONCE
OUTPATIENT
Start: 2023-09-21 | End: 2023-09-21

## 2023-09-21 RX ORDER — TRIAMCINOLONE ACETONIDE 1 MG/G
OINTMENT TOPICAL ONCE
OUTPATIENT
Start: 2023-09-21 | End: 2023-09-21

## 2023-09-21 RX ORDER — LIDOCAINE 50 MG/G
OINTMENT TOPICAL ONCE
OUTPATIENT
Start: 2023-09-21 | End: 2023-09-21

## 2023-09-21 RX ORDER — LIDOCAINE HYDROCHLORIDE 10 MG/ML
5 INJECTION, SOLUTION INFILTRATION; PERINEURAL ONCE
Status: COMPLETED | OUTPATIENT
Start: 2023-09-21 | End: 2023-09-21

## 2023-09-21 RX ORDER — LIDOCAINE 40 MG/G
CREAM TOPICAL ONCE
OUTPATIENT
Start: 2023-09-21 | End: 2023-09-21

## 2023-09-21 RX ORDER — BACITRACIN ZINC AND POLYMYXIN B SULFATE 500; 1000 [USP'U]/G; [USP'U]/G
OINTMENT TOPICAL ONCE
OUTPATIENT
Start: 2023-09-21 | End: 2023-09-21

## 2023-09-21 RX ORDER — LIDOCAINE HYDROCHLORIDE 10 MG/ML
5 INJECTION, SOLUTION INFILTRATION; PERINEURAL ONCE
Start: 2023-09-21 | End: 2023-09-21

## 2023-09-21 RX ORDER — BETAMETHASONE DIPROPIONATE 0.05 %
OINTMENT (GRAM) TOPICAL ONCE
OUTPATIENT
Start: 2023-09-21 | End: 2023-09-21

## 2023-09-21 RX ORDER — CLOBETASOL PROPIONATE 0.5 MG/G
OINTMENT TOPICAL ONCE
OUTPATIENT
Start: 2023-09-21 | End: 2023-09-21

## 2023-09-21 RX ORDER — GENTAMICIN SULFATE 1 MG/G
OINTMENT TOPICAL ONCE
OUTPATIENT
Start: 2023-09-21 | End: 2023-09-21

## 2023-09-21 RX ORDER — IBUPROFEN 200 MG
TABLET ORAL ONCE
OUTPATIENT
Start: 2023-09-21 | End: 2023-09-21

## 2023-09-21 RX ADMIN — LIDOCAINE HYDROCHLORIDE 5 ML: 10 INJECTION, SOLUTION INFILTRATION; PERINEURAL at 10:21

## 2023-09-21 ASSESSMENT — PAIN SCALES - GENERAL: PAINLEVEL_OUTOF10: 0

## 2023-09-21 ASSESSMENT — PAIN - FUNCTIONAL ASSESSMENT: PAIN_FUNCTIONAL_ASSESSMENT: ACTIVITIES ARE NOT PREVENTED

## 2023-10-03 NOTE — DISCHARGE INSTRUCTIONS
[address] and Hyperbaric Oxygen Therapy   Physician Orders and Discharge Instructions  1830 [facility],[address] [place] [address], [address]  Telephone: [phone]    NAME:  Cheryl Alvarez OF BIRTH:  1945  MEDICAL RECORD NUMBER:  [id number]  DATE:  10/5/23    Discharge condition: Stable    Discharge to: Home    Left via:Private automobile    Accompanied by:  self    ECF/HHA: [place]     Dressing Orders:  Left leg wound: soap and water wash, apply wound gel then hydrofera blue silicone bandage to the wound bed, change every other day. Treatment Orders:  Protein rich diet (unless restricted by your physician); Multivitamin daily; Elevate legs above the level of your heart when sitting 3-4 times daily for at least one hour each time, avoid standing for long periods of time. [facility] follow up visit ___2 week with Kasia__________________  (Please note your next appointment above and if you are unable to keep, kindly give a 24 hour notice. Thank you.)          If you experience any of the following, please call the [employer] during business hours:    * Increase in Pain  * Temperature over 101  * Increase in drainage from your wound  * Drainage with a foul odor  * Bleeding  * Increase in swelling  * Need for compression bandage changes due to slippage, breakthrough drainage. If you need medical attention outside of the business hours of the [employer] please contact your PCP or go to the nearest emergency room.

## 2023-10-05 ENCOUNTER — HOSPITAL ENCOUNTER (OUTPATIENT)
Dept: WOUND CARE | Age: 78
Discharge: HOME | End: 2023-10-05
Payer: MEDICARE

## 2023-10-05 VITALS
SYSTOLIC BLOOD PRESSURE: 156 MMHG | BODY MASS INDEX: 27.3 KG/M2 | HEIGHT: 71 IN | WEIGHT: 195 LBS | DIASTOLIC BLOOD PRESSURE: 79 MMHG | RESPIRATION RATE: 18 BRPM | HEART RATE: 70 BPM | TEMPERATURE: 98.2 F

## 2023-10-05 DIAGNOSIS — I73.9 PVD (PERIPHERAL VASCULAR DISEASE) (HCC): ICD-10-CM

## 2023-10-05 DIAGNOSIS — L97.922 SKIN ULCER OF LEFT LOWER LEG WITH FAT LAYER EXPOSED (HCC): Primary | ICD-10-CM

## 2023-10-05 PROCEDURE — 6370000000 HC RX 637 (ALT 250 FOR IP): Performed by: NURSE PRACTITIONER

## 2023-10-05 PROCEDURE — 99213 OFFICE O/P EST LOW 20 MIN: CPT

## 2023-10-05 PROCEDURE — 99212 OFFICE O/P EST SF 10 MIN: CPT | Performed by: NURSE PRACTITIONER

## 2023-10-05 RX ORDER — TRIAMCINOLONE ACETONIDE 1 MG/G
OINTMENT TOPICAL ONCE
OUTPATIENT
Start: 2023-10-05 | End: 2023-10-05

## 2023-10-05 RX ORDER — GENTAMICIN SULFATE 1 MG/G
OINTMENT TOPICAL ONCE
OUTPATIENT
Start: 2023-10-05 | End: 2023-10-05

## 2023-10-05 RX ORDER — IBUPROFEN 200 MG
TABLET ORAL ONCE
OUTPATIENT
Start: 2023-10-05 | End: 2023-10-05

## 2023-10-05 RX ORDER — LIDOCAINE HYDROCHLORIDE 10 MG/ML
5 INJECTION, SOLUTION INFILTRATION; PERINEURAL ONCE
Start: 2023-10-05 | End: 2023-10-05

## 2023-10-05 RX ORDER — LIDOCAINE HYDROCHLORIDE 20 MG/ML
JELLY TOPICAL ONCE
Status: COMPLETED | OUTPATIENT
Start: 2023-10-05 | End: 2023-10-05

## 2023-10-05 RX ORDER — LIDOCAINE HYDROCHLORIDE 20 MG/ML
JELLY TOPICAL ONCE
OUTPATIENT
Start: 2023-10-05 | End: 2023-10-05

## 2023-10-05 RX ORDER — CLOBETASOL PROPIONATE 0.5 MG/G
OINTMENT TOPICAL ONCE
OUTPATIENT
Start: 2023-10-05 | End: 2023-10-05

## 2023-10-05 RX ORDER — BETAMETHASONE DIPROPIONATE 0.05 %
OINTMENT (GRAM) TOPICAL ONCE
OUTPATIENT
Start: 2023-10-05 | End: 2023-10-05

## 2023-10-05 RX ORDER — LIDOCAINE HYDROCHLORIDE 40 MG/ML
SOLUTION TOPICAL ONCE
OUTPATIENT
Start: 2023-10-05 | End: 2023-10-05

## 2023-10-05 RX ORDER — LIDOCAINE 40 MG/G
CREAM TOPICAL ONCE
OUTPATIENT
Start: 2023-10-05 | End: 2023-10-05

## 2023-10-05 RX ORDER — GINSENG 100 MG
CAPSULE ORAL ONCE
OUTPATIENT
Start: 2023-10-05 | End: 2023-10-05

## 2023-10-05 RX ORDER — LIDOCAINE 50 MG/G
OINTMENT TOPICAL ONCE
OUTPATIENT
Start: 2023-10-05 | End: 2023-10-05

## 2023-10-05 RX ORDER — BACITRACIN ZINC AND POLYMYXIN B SULFATE 500; 1000 [USP'U]/G; [USP'U]/G
OINTMENT TOPICAL ONCE
OUTPATIENT
Start: 2023-10-05 | End: 2023-10-05

## 2023-10-05 RX ADMIN — LIDOCAINE HYDROCHLORIDE: 20 JELLY TOPICAL at 09:34

## 2023-10-05 NOTE — HOME CARE
[address] [address] [address]:     [facility] 2204 Count includes the [facility] 30[address] [facility], [address]  p: [phone] f: [phone]     Ordering Center:     [address] [place] [address] [address] [place] [id number] [phone]  WOUND CARE Dept: [id number] [facility] NUMBER     Patient Information:      Danielle Cardona  [address]  [place]  [id number]   [phone]   : 1945  AGE: 69 y.o. GENDER: male   EPISODE DATE: 10/5/2023    Insurance:      PRIMARY INSURANCE:  Plan: [employer] ESSENTIAL/PLUS  Coverage: BCBS MEDICARE  Effective Date: 2015  Group Number: [unfilled]  Subscriber Number: [id number] - (Medicare Managed)    Payer/Plan Subscr  Sex Relation Sub. Ins. ID Effective Group Num   1. BCBS MEDICARE* Danielle Cardona 1945 Male Self [id number] 1/1/15 [id number]                                   [address]       Patient Wound Information:      Problem List Items Addressed This Visit          Circulatory    PVD (peripheral vascular disease) ([address])    Relevant Orders    Initiate Outpatient Wound Care Protocol       Other    Skin ulcer of left lower leg with fat layer exposed ([address]) - Primary    Relevant Orders    Initiate Outpatient Wound Care Protocol       WOUNDS REQUIRING DRESSING SUPPLIES:     Wound 23 Pretibial Left #2 left medial (venous) (Active)   Wound Image   10/05/23 0933   Wound Etiology Venous 10/05/23 0933   Dressing Status New dressing applied 10/05/23 0951   Wound Cleansed Soap and water 10/05/23 0933   Dressing/Treatment Gauze dressing/dressing sponge; Hydrofera blue;Tape/Soft cloth adhesive tape 10/05/23 0951   Dressing Change Due 23 0934   Wound Length (cm) 4.8 cm 10/05/23 0933   Wound Width (cm) 3.2 cm 10/05/23 0933   Wound Depth (cm) 0.1 cm 10/05/23 0933   Wound Surface Area (cm^2) 15.36 cm^2 10/05/23 0933   Change in Wound Size % (l*w) -284 10/05/23 9734   Wound Volume (cm^3)

## 2023-10-05 NOTE — PATIENT INSTRUCTIONS
[address] and Hyperbaric Oxygen Therapy   Physician Orders and Discharge Instructions  1830 [facility],[address] [place] [address], [address]  Telephone: [phone]    NAME:  James Curry OF BIRTH:  1945  MEDICAL RECORD NUMBER:  [id number]  DATE:  10/5/23    Discharge condition: Stable    Discharge to: Home    Left via:Private automobile    Accompanied by:  self    ECF/HHA: [place]     Dressing Orders:  Left leg wound: soap and water wash, apply wound gel then hydrofera blue silicone bandage to the wound bed, change every other day. Treatment Orders:  Protein rich diet (unless restricted by your physician); Multivitamin daily; Elevate legs above the level of your heart when sitting 3-4 times daily for at least one hour each time, avoid standing for long periods of time. 401 [facility] follow up visit ___2 week with Kasia__________________  (Please note your next appointment above and if you are unable to keep, kindly give a 24 hour notice. Thank you.)          If you experience any of the following, please call the [employer] during business hours:    * Increase in Pain  * Temperature over 101  * Increase in drainage from your wound  * Drainage with a foul odor  * Bleeding  * Increase in swelling  * Need for compression bandage changes due to slippage, breakthrough drainage. If you need medical attention outside of the business hours of the [facility] [employer] please contact your PCP or go to the nearest emergency room.

## 2023-10-05 NOTE — PLAN OF CARE
Problem: Wound:  Goal: Will show signs of wound healing; wound closure and no evidence of infection  Description: Will show signs of wound healing; wound closure and no evidence of infection  Outcome: Progressing     Problem: Venous:  Goal: Signs of wound healing will improve  Description: Signs of wound healing will improve  Outcome: Progressing     Problem: Compression therapy:  Goal: Will be free from complications associated with compression therapy  Description: Will be free from complications associated with compression therapy  Outcome: Progressing     Problem: Weight control:  Goal: Ability to maintain an optimal weight for height and age will be supported  Description: Ability to maintain an optimal weight for height and age will be supported  Outcome: Progressing     Problem: Falls - Risk of:  Goal: Will remain free from falls  Description: Will remain free from falls  Outcome: Progressing

## 2023-10-16 NOTE — DISCHARGE INSTRUCTIONS
[address] and Hyperbaric Oxygen Therapy   Physician Orders and Discharge Instructions  1830 [facility],[address] [place] [address], [address]  Telephone: [phone]    NAME:  Tamika Del Valle OF BIRTH:  1945  MEDICAL RECORD NUMBER:  [id number]  DATE:  10/16/2023    Discharge condition: Stable    Discharge to: Home    Left via:Private automobile    Accompanied by:  self    ECF/HHA: [place]     Dressing Orders:  Left leg wound: soap and water wash, apply wound gel then hydrofera blue silicone bandage to the wound bed, change every other day. Treatment Orders:  Protein rich diet (unless restricted by your physician); Multivitamin daily; Elevate legs above the level of your heart when sitting 3-4 times daily for at least one hour each time, avoid standing for long periods of time. 401 [facility] follow up visit ___2 week with Kasia__________________  (Please note your next appointment above and if you are unable to keep, kindly give a 24 hour notice. Thank you.)          If you experience any of the following, please call the [employer] during business hours:    * Increase in Pain  * Temperature over 101  * Increase in drainage from your wound  * Drainage with a foul odor  * Bleeding  * Increase in swelling  * Need for compression bandage changes due to slippage, breakthrough drainage. If you need medical attention outside of the business hours of the [facility] [employer] please contact your PCP or go to the nearest emergency room.

## 2023-10-19 ENCOUNTER — HOSPITAL ENCOUNTER (OUTPATIENT)
Dept: WOUND CARE | Age: 78
Discharge: HOME | End: 2023-10-19
Payer: MEDICARE

## 2023-10-19 VITALS
BODY MASS INDEX: 27.3 KG/M2 | HEART RATE: 65 BPM | WEIGHT: 195 LBS | DIASTOLIC BLOOD PRESSURE: 77 MMHG | TEMPERATURE: 97.8 F | SYSTOLIC BLOOD PRESSURE: 142 MMHG | RESPIRATION RATE: 18 BRPM | HEIGHT: 71 IN

## 2023-10-19 DIAGNOSIS — I73.9 PVD (PERIPHERAL VASCULAR DISEASE) (HCC): ICD-10-CM

## 2023-10-19 DIAGNOSIS — L97.922 SKIN ULCER OF LEFT LOWER LEG WITH FAT LAYER EXPOSED (HCC): Primary | ICD-10-CM

## 2023-10-19 PROCEDURE — 99213 OFFICE O/P EST LOW 20 MIN: CPT

## 2023-10-19 PROCEDURE — 99212 OFFICE O/P EST SF 10 MIN: CPT | Performed by: NURSE PRACTITIONER

## 2023-10-19 RX ORDER — LIDOCAINE HYDROCHLORIDE 20 MG/ML
JELLY TOPICAL ONCE
OUTPATIENT
Start: 2023-10-19 | End: 2023-10-19

## 2023-10-19 RX ORDER — LIDOCAINE 50 MG/G
OINTMENT TOPICAL ONCE
OUTPATIENT
Start: 2023-10-19 | End: 2023-10-19

## 2023-10-19 RX ORDER — IBUPROFEN 200 MG
TABLET ORAL ONCE
OUTPATIENT
Start: 2023-10-19 | End: 2023-10-19

## 2023-10-19 RX ORDER — BETAMETHASONE DIPROPIONATE 0.05 %
OINTMENT (GRAM) TOPICAL ONCE
OUTPATIENT
Start: 2023-10-19 | End: 2023-10-19

## 2023-10-19 RX ORDER — LIDOCAINE HYDROCHLORIDE 40 MG/ML
SOLUTION TOPICAL ONCE
OUTPATIENT
Start: 2023-10-19 | End: 2023-10-19

## 2023-10-19 RX ORDER — GENTAMICIN SULFATE 1 MG/G
OINTMENT TOPICAL ONCE
OUTPATIENT
Start: 2023-10-19 | End: 2023-10-19

## 2023-10-19 RX ORDER — TRIAMCINOLONE ACETONIDE 1 MG/G
OINTMENT TOPICAL ONCE
OUTPATIENT
Start: 2023-10-19 | End: 2023-10-19

## 2023-10-19 RX ORDER — LIDOCAINE 40 MG/G
CREAM TOPICAL ONCE
OUTPATIENT
Start: 2023-10-19 | End: 2023-10-19

## 2023-10-19 RX ORDER — BACITRACIN ZINC AND POLYMYXIN B SULFATE 500; 1000 [USP'U]/G; [USP'U]/G
OINTMENT TOPICAL ONCE
OUTPATIENT
Start: 2023-10-19 | End: 2023-10-19

## 2023-10-19 RX ORDER — CLOBETASOL PROPIONATE 0.5 MG/G
OINTMENT TOPICAL ONCE
OUTPATIENT
Start: 2023-10-19 | End: 2023-10-19

## 2023-10-19 RX ORDER — LIDOCAINE HYDROCHLORIDE 10 MG/ML
5 INJECTION, SOLUTION INFILTRATION; PERINEURAL ONCE
Start: 2023-10-19 | End: 2023-10-19

## 2023-10-19 RX ORDER — GINSENG 100 MG
CAPSULE ORAL ONCE
OUTPATIENT
Start: 2023-10-19 | End: 2023-10-19

## 2023-10-19 ASSESSMENT — PAIN SCALES - GENERAL: PAINLEVEL_OUTOF10: 0

## 2023-10-19 NOTE — PROGRESS NOTES
[address]   Progress Note and Procedure Note      Heike Mckeon RECORD NUMBER:  [id number]  AGE: 69 y.o. GENDER: male  : 1945  EPISODE DATE:  10/19/2023    Subjective:     Chief Complaint   Patient presents with    Wound Check         HISTORY of PRESENT ILLNESS HPI     Alpesh Sharma is a 69 y.o. male who presents today for wound/ulcer evaluation. History of Wound Context: left leg wound follow up/eval and treat    Ulcer Identification:  Ulcer Type: venous  Contributing Factors: edema and inflammation    Wound: N/A        PAST MEDICAL HISTORY        Diagnosis Date    Hepatitis A 1970    Hyperlipidemia     mild    Hypertension     Panic attacks        PAST SURGICAL HISTORY    Past Surgical History:   Procedure Laterality Date    COLONOSCOPY  2004    int. hemorrhoids,     HIP ARTHROPLASTY Right     VASCULAR SURGERY      left leg       FAMILY HISTORY    History reviewed. No pertinent family history.     SOCIAL HISTORY    Social History     Tobacco Use    Smoking status: Never    Smokeless tobacco: Never   Substance Use Topics    Alcohol use: Yes    Drug use: No       ALLERGIES    Allergies   Allergen Reactions    Norco [Hydrocodone-Acetaminophen] Other (See Comments)     insomnia    Tetracyclines & Related        MEDICATIONS    Current Outpatient Medications on File Prior to Encounter   Medication Sig Dispense Refill    lisinopril (PRINIVIL;ZESTRIL) 20 MG tablet Take 1 tablet by mouth daily      Multiple Vitamins-Minerals (THERAPEUTIC MULTIVITAMIN-MINERALS) tablet Take 1 tablet by mouth daily      cetirizine (ZYRTEC) 10 MG tablet Take 1 tablet by mouth daily      allopurinol (ZYLOPRIM) 300 MG tablet Take 1 tablet by mouth daily 30 tablet 5    ALPRAZolam (XANAX) 0.5 MG tablet Take 1 tablet by mouth 3 times daily as needed for Anxiety 60 tablet 0    vardenafil (LEVITRA) 20 MG tablet Take 1 tablet by mouth as needed for Erectile Dysfunction (take 30mins to one hour

## 2023-10-19 NOTE — PATIENT INSTRUCTIONS
[address] and Hyperbaric Oxygen Therapy   Physician Orders and Discharge Instructions  1830 [facility],[address] [place] [address] Bl, [address]  Telephone: [phone]    NAME:  Héctor Euceda OF BIRTH:  1945  MEDICAL RECORD NUMBER:  [id number]  DATE:  10/16/2023    Discharge condition: Stable    Discharge to: Home    Left via:Private automobile    Accompanied by:  self    ECF/HHA: [place]     Dressing Orders:  Left leg wound: soap and water wash, apply wound gel then hydrofera blue silicone bandage to the wound bed, change every other day. Treatment Orders:  Protein rich diet (unless restricted by your physician); Multivitamin daily; Elevate legs above the level of your heart when sitting 3-4 times daily for at least one hour each time, avoid standing for long periods of time. 401 [facility] follow up visit ___2 week with Kasia__________________  (Please note your next appointment above and if you are unable to keep, kindly give a 24 hour notice. Thank you.)          If you experience any of the following, please call the [employer] during business hours:    * Increase in Pain  * Temperature over 101  * Increase in drainage from your wound  * Drainage with a foul odor  * Bleeding  * Increase in swelling  * Need for compression bandage changes due to slippage, breakthrough drainage. If you need medical attention outside of the business hours of the [facility] [employer] please contact your PCP or go to the nearest emergency room.

## 2023-10-31 NOTE — PATIENT INSTRUCTIONS
[address] and Hyperbaric Oxygen Therapy   Physician Orders and Discharge Instructions  1830 [facility],[address] [facility]1 [facility] Bl, [address]  Telephone: [phone]    NAME:  Mily Hogan OF BIRTH:  1945  MEDICAL RECORD NUMBER:  [id number]  DATE:  11/2/2023    Discharge condition: Stable    Discharge to: Home    Left via:Private automobile    Accompanied by:  self    ECF/HHA:     Dressing Orders:  Left leg wound: soap and water wash, apply blue pad silicone pad once daily. Alternative dressing calcium alginate to the wound bed, apply absorbent bandage over the calcium alginate, wrap with rolled gauze and ace wrap daily. Treatment Orders:  Protein rich diet (unless restricted by your physician); Multivitamin daily; Elevate legs above the level of your heart when sitting 3-4 times daily for at least one hour each time, avoid standing for long periods of time. Take antibiotics  Put steroid ointment on red skin twice daily    [facility] follow up visit ____2 weeks with Kasia_________________________  (Please note your next appointment above and if you are unable to keep, kindly give a 24 hour notice. Thank you.)          If you experience any of the following, please call the [employer] during business hours:    * Increase in Pain  * Temperature over 101  * Increase in drainage from your wound  * Drainage with a foul odor  * Bleeding  * Increase in swelling  * Need for compression bandage changes due to slippage, breakthrough drainage. If you need medical attention outside of the business hours of the [facility] [employer] please contact your PCP or go to the nearest emergency room.

## 2023-11-02 ENCOUNTER — TELEPHONE (OUTPATIENT)
Dept: WOUND CARE | Age: 78
End: 2023-11-02

## 2023-11-02 ENCOUNTER — HOSPITAL ENCOUNTER (OUTPATIENT)
Dept: WOUND CARE | Age: 78
Discharge: HOME | End: 2023-11-02
Payer: MEDICARE

## 2023-11-02 VITALS
SYSTOLIC BLOOD PRESSURE: 190 MMHG | TEMPERATURE: 97.8 F | WEIGHT: 195 LBS | DIASTOLIC BLOOD PRESSURE: 77 MMHG | HEIGHT: 71 IN | HEART RATE: 65 BPM | RESPIRATION RATE: 18 BRPM | BODY MASS INDEX: 27.3 KG/M2

## 2023-11-02 DIAGNOSIS — I73.9 PVD (PERIPHERAL VASCULAR DISEASE) (HCC): ICD-10-CM

## 2023-11-02 DIAGNOSIS — L97.922 SKIN ULCER OF LEFT LOWER LEG WITH FAT LAYER EXPOSED (HCC): Primary | ICD-10-CM

## 2023-11-02 PROCEDURE — 99212 OFFICE O/P EST SF 10 MIN: CPT

## 2023-11-02 PROCEDURE — 99212 OFFICE O/P EST SF 10 MIN: CPT | Performed by: NURSE PRACTITIONER

## 2023-11-02 RX ORDER — TRIAMCINOLONE ACETONIDE 0.25 MG/G
OINTMENT TOPICAL
Qty: 80 G | Refills: 4 | Status: SHIPPED | OUTPATIENT
Start: 2023-11-02 | End: 2023-11-09

## 2023-11-02 RX ORDER — DOXYCYCLINE 100 MG/1
100 CAPSULE ORAL 2 TIMES DAILY
Qty: 20 CAPSULE | Refills: 0 | Status: SHIPPED | OUTPATIENT
Start: 2023-11-02 | End: 2023-11-12

## 2023-11-02 NOTE — DISCHARGE INSTRUCTIONS
[address] and Hyperbaric Oxygen Therapy   Physician Orders and Discharge Instructions  1830 [facility],[address] [facility]1 [facility], [address]  Telephone: [phone]    NAME:  Gianna Chavarria OF BIRTH:  1945  MEDICAL RECORD NUMBER:  [id number]  DATE:  11/2/2023    Discharge condition: Stable    Discharge to: Home    Left via:Private automobile    Accompanied by:  self    ECF/HHA:     Dressing Orders:  Left leg wound: soap and water wash, apply blue pad silicone pad once daily. Alternative dressing calcium alginate to the wound bed, apply absorbent bandage over the calcium alginate, wrap with rolled gauze and ace wrap daily. Treatment Orders:  Protein rich diet (unless restricted by your physician); Multivitamin daily; Elevate legs above the level of your heart when sitting 3-4 times daily for at least one hour each time, avoid standing for long periods of time. Take antibiotics  Put steroid ointment on red skin twice daily    401 [facility] follow up visit _____________________________  (Please note your next appointment above and if you are unable to keep, kindly give a 24 hour notice. Thank you.)          If you experience any of the following, please call the [facility] [employer] during business hours:    * Increase in Pain  * Temperature over 101  * Increase in drainage from your wound  * Drainage with a foul odor  * Bleeding  * Increase in swelling  * Need for compression bandage changes due to slippage, breakthrough drainage. If you need medical attention outside of the business hours of the [address] [place] [address] please contact your PCP or go to the nearest emergency room.

## 2023-11-02 NOTE — TELEPHONE ENCOUNTER
Received message from Pelon Youssef [phone] re: allergy to tetracycline on file, wanted to check before filling the prescription. Call placed to [place] re: pharmacist call. Stated she and patient had discussed the allergy, patient had stated this was back in the 1980's and was willing to try the doxycycline and will call APRN tomorrow if he has any problems with it. Called to pharmacist [place] [phone] and relayed above conversation, stated she would fill the prescription.

## 2023-11-02 NOTE — PROGRESS NOTES
[address]   Progress Note and Procedure Note      Heike Mckeon RECORD NUMBER:  [id number]  AGE: 69 y.o. GENDER: male  : 1945  EPISODE DATE:  2023    Subjective:     Chief Complaint   Patient presents with    Wound Check     Left leg excessively drainage and skin is irritated         HISTORY of PRESENT ILLNESS HPI     Zohaib Lee is a 69 y.o. male who presents today for wound/ulcer evaluation. History of Wound Context: left leg wound follow up/eval and treat    Ulcer Identification:  Ulcer Type: venous  Contributing Factors: edema and infection    Wound: N/A        PAST MEDICAL HISTORY        Diagnosis Date    Hepatitis A     Hyperlipidemia     mild    Hypertension     Panic attacks        PAST SURGICAL HISTORY    Past Surgical History:   Procedure Laterality Date    COLONOSCOPY  2004    int. hemorrhoids,     HIP ARTHROPLASTY Right     VASCULAR SURGERY      left leg       FAMILY HISTORY    History reviewed. No pertinent family history.     SOCIAL HISTORY    Social History     Tobacco Use    Smoking status: Never    Smokeless tobacco: Never   Substance Use Topics    Alcohol use: Yes    Drug use: No       ALLERGIES    Allergies   Allergen Reactions    Norco [Hydrocodone-Acetaminophen] Other (See Comments)     insomnia    Tetracyclines & Related        MEDICATIONS    Current Outpatient Medications on File Prior to Encounter   Medication Sig Dispense Refill    lisinopril (PRINIVIL;ZESTRIL) 20 MG tablet Take 1 tablet by mouth daily      Multiple Vitamins-Minerals (THERAPEUTIC MULTIVITAMIN-MINERALS) tablet Take 1 tablet by mouth daily      cetirizine (ZYRTEC) 10 MG tablet Take 1 tablet by mouth daily      allopurinol (ZYLOPRIM) 300 MG tablet Take 1 tablet by mouth daily 30 tablet 5    ALPRAZolam (XANAX) 0.5 MG tablet Take 1 tablet by mouth 3 times daily as needed for Anxiety 60 tablet 0    vardenafil (LEVITRA) 20 MG tablet Take 1 tablet by mouth as needed

## 2023-11-13 ENCOUNTER — HOSPITAL ENCOUNTER (OUTPATIENT)
Dept: WOUND CARE | Age: 78
Discharge: HOME | End: 2023-11-13
Payer: MEDICARE

## 2023-11-13 VITALS
BODY MASS INDEX: 27.92 KG/M2 | TEMPERATURE: 98.3 F | WEIGHT: 195 LBS | SYSTOLIC BLOOD PRESSURE: 166 MMHG | HEIGHT: 70 IN | DIASTOLIC BLOOD PRESSURE: 82 MMHG | RESPIRATION RATE: 18 BRPM | HEART RATE: 67 BPM

## 2023-11-13 DIAGNOSIS — L97.922 SKIN ULCER OF LEFT LOWER LEG WITH FAT LAYER EXPOSED (HCC): Primary | ICD-10-CM

## 2023-11-13 DIAGNOSIS — I73.9 PVD (PERIPHERAL VASCULAR DISEASE) (HCC): ICD-10-CM

## 2023-11-13 PROCEDURE — 97598 DBRDMT OPN WND ADDL 20CM/<: CPT | Performed by: NURSE PRACTITIONER

## 2023-11-13 PROCEDURE — 97597 DBRDMT OPN WND 1ST 20 CM/<: CPT

## 2023-11-13 PROCEDURE — 97598 DBRDMT OPN WND ADDL 20CM/<: CPT

## 2023-11-13 PROCEDURE — 97597 DBRDMT OPN WND 1ST 20 CM/<: CPT | Performed by: NURSE PRACTITIONER

## 2023-11-13 RX ORDER — DOXYCYCLINE 100 MG/1
100 CAPSULE ORAL 2 TIMES DAILY
Qty: 60 CAPSULE | Refills: 0 | Status: SHIPPED | OUTPATIENT
Start: 2023-11-13 | End: 2023-12-13

## 2023-11-13 RX ORDER — LIDOCAINE HYDROCHLORIDE 20 MG/ML
JELLY TOPICAL ONCE
OUTPATIENT
Start: 2023-11-13 | End: 2023-11-13

## 2023-11-13 RX ORDER — BACITRACIN ZINC AND POLYMYXIN B SULFATE 500; 1000 [USP'U]/G; [USP'U]/G
OINTMENT TOPICAL ONCE
OUTPATIENT
Start: 2023-11-13 | End: 2023-11-13

## 2023-11-13 RX ORDER — LIDOCAINE 50 MG/G
OINTMENT TOPICAL ONCE
OUTPATIENT
Start: 2023-11-13 | End: 2023-11-13

## 2023-11-13 RX ORDER — LIDOCAINE HYDROCHLORIDE 10 MG/ML
5 INJECTION, SOLUTION INFILTRATION; PERINEURAL ONCE
Start: 2023-11-13 | End: 2023-11-13

## 2023-11-13 RX ORDER — IBUPROFEN 200 MG
TABLET ORAL ONCE
OUTPATIENT
Start: 2023-11-13 | End: 2023-11-13

## 2023-11-13 RX ORDER — GENTAMICIN SULFATE 1 MG/G
OINTMENT TOPICAL ONCE
OUTPATIENT
Start: 2023-11-13 | End: 2023-11-13

## 2023-11-13 RX ORDER — BETAMETHASONE DIPROPIONATE 0.05 %
OINTMENT (GRAM) TOPICAL ONCE
OUTPATIENT
Start: 2023-11-13 | End: 2023-11-13

## 2023-11-13 RX ORDER — LIDOCAINE 40 MG/G
CREAM TOPICAL ONCE
OUTPATIENT
Start: 2023-11-13 | End: 2023-11-13

## 2023-11-13 RX ORDER — CLOBETASOL PROPIONATE 0.5 MG/G
OINTMENT TOPICAL ONCE
OUTPATIENT
Start: 2023-11-13 | End: 2023-11-13

## 2023-11-13 RX ORDER — TRIAMCINOLONE ACETONIDE 1 MG/G
OINTMENT TOPICAL ONCE
OUTPATIENT
Start: 2023-11-13 | End: 2023-11-13

## 2023-11-13 RX ORDER — LIDOCAINE HYDROCHLORIDE 20 MG/ML
JELLY TOPICAL ONCE
Status: COMPLETED | OUTPATIENT
Start: 2023-11-13 | End: 2023-11-13

## 2023-11-13 RX ORDER — LIDOCAINE HYDROCHLORIDE 40 MG/ML
SOLUTION TOPICAL ONCE
OUTPATIENT
Start: 2023-11-13 | End: 2023-11-13

## 2023-11-13 RX ORDER — GINSENG 100 MG
CAPSULE ORAL ONCE
OUTPATIENT
Start: 2023-11-13 | End: 2023-11-13

## 2023-11-13 RX ADMIN — LIDOCAINE HYDROCHLORIDE: 20 JELLY TOPICAL at 10:28

## 2023-11-13 NOTE — PATIENT INSTRUCTIONS
[address] and Hyperbaric Oxygen Therapy   Physician Orders and Discharge Instructions  1830 [facility],[address] [facility]1 [facility] [address], [address]  Telephone: [phone]    NAME:  Michelle Hinton OF BIRTH:  1945  MEDICAL RECORD NUMBER:  [id number]  DATE:  11/13/23    Discharge condition: Stable    Discharge to: Home    Left via:Private automobile    Accompanied by:  self    ECF/HHA:     Dressing Orders:  Left leg wound: soap and water wash, Calcium alginate to the wound bed, (apply absorbent bandage over the calcium alginate if more drainage occurs), wrap with rolled gauze and ace wrap daily. Treatment Orders:  Protein rich diet (unless restricted by your physician); Multivitamin daily; Elevate legs above the level of your heart when sitting 3-4 times daily for at least one hour each time, avoid standing for long periods of time. Take antibiotics  Put steroid ointment on red skin twice daily    401 [facility] follow up visit ____2 weeks with Kasia_________________________  (Please note your next appointment above and if you are unable to keep, kindly give a 24 hour notice. Thank you.)          If you experience any of the following, please call the [address] [place], [place] [address]i [employer] during business hours:    * Increase in Pain  * Temperature over 101  * Increase in drainage from your wound  * Drainage with a foul odor  * Bleeding  * Increase in swelling  * Need for compression bandage changes due to slippage, breakthrough drainage. If you need medical attention outside of the business hours of the [address] [place] [address] please contact your PCP or go to the nearest emergency room.

## 2023-11-13 NOTE — HOME CARE
[address] [address] [address]:     [facility] 2204 [facility] 3033 [facility], [address]  p: [phone] f: [phone]     Ordering Center:     [address] [place] [address] [address] [place] [id number] [phone]  WOUND CARE Dept: [id number] [place] [address] [phone]    Patient Information:      Norma Mccainw  [address]  [place] [address]   [phone]   : 1945  AGE: 69 y.o. GENDER: male   EPISODE DATE: 2023    Insurance:      PRIMARY INSURANCE:  Plan: Dennis Viralheat[employer]isa ESSENTIAL/PLUS  Coverage: BCBS MEDICARE  Effective Date: 2015  Group Number: [unfilled]  Subscriber Number: [id number] - (Medicare Managed)    Payer/Plan Subscr  Sex Relation Sub. Ins. ID Effective Group Num   1. BCBS MEDICARE* Norma Castro 1945 Male Self [id number] 1/1/15 [id number]                                   [address]       Patient Wound Information:      Problem List Items Addressed This Visit          Circulatory    PVD (peripheral vascular disease) ([address])    Relevant Orders    Initiate Outpatient Wound Care Protocol       Other    Skin ulcer of left lower leg with fat layer exposed ([address]) - Primary    Relevant Orders    Initiate Outpatient Wound Care Protocol       WOUNDS REQUIRING DRESSING SUPPLIES:     Wound 23 Pretibial Left #2 left medial (venous) (Active)   Wound Image   23 1024   Wound Etiology Venous 23 1024   Dressing Status Old drainage noted 23 1024   Wound Cleansed Soap and water 23 1024   Dressing/Treatment Alginate with Ag; Ace wrap 23 1523   Dressing Change Due 23 0934   Wound Length (cm) 4 cm 23 1024   Wound Width (cm) 6 cm 23 1024   Wound Depth (cm) 0.1 cm 23 1024   Wound Surface Area (cm^2) 24 cm^2 23 1024   Change in Wound Size % (l*w) -500 23 1024   Wound Volume (cm^3) 2.4 cm^3 23 1024   Wound Healing % -500

## 2023-11-13 NOTE — PROGRESS NOTES
[address]   Progress Note and Procedure Note      Heike Mckeon RECORD NUMBER:  [id number]  AGE: 69 y.o. GENDER: male  : 1945  EPISODE DATE:  2023    Subjective:     Chief Complaint   Patient presents with    Wound Check      HISTORY of PRESENT ILLNESS HPI     Jerri Agustin is a 69 y.o. male who presents today for wound/ulcer evaluation. History of Wound Context: left leg wound follow up/eval and treat    Ulcer Identification:  Ulcer Type: venous  Contributing Factors: edema    Wound: N/A        PAST MEDICAL HISTORY        Diagnosis Date    Hepatitis A     Hyperlipidemia     mild    Hypertension     Panic attacks        PAST SURGICAL HISTORY    Past Surgical History:   Procedure Laterality Date    COLONOSCOPY  2004    int. hemorrhoids,     HIP ARTHROPLASTY Right     VASCULAR SURGERY      left leg       FAMILY HISTORY    History reviewed. No pertinent family history.     SOCIAL HISTORY    Social History     Tobacco Use    Smoking status: Never    Smokeless tobacco: Never   Substance Use Topics    Alcohol use: Yes    Drug use: No       ALLERGIES    Allergies   Allergen Reactions    Norco [Hydrocodone-Acetaminophen] Other (See Comments)     insomnia       MEDICATIONS    Current Outpatient Medications on File Prior to Encounter   Medication Sig Dispense Refill    lisinopril (PRINIVIL;ZESTRIL) 20 MG tablet Take 1 tablet by mouth daily      Multiple Vitamins-Minerals (THERAPEUTIC MULTIVITAMIN-MINERALS) tablet Take 1 tablet by mouth daily      cetirizine (ZYRTEC) 10 MG tablet Take 1 tablet by mouth daily      allopurinol (ZYLOPRIM) 300 MG tablet Take 1 tablet by mouth daily 30 tablet 5    ALPRAZolam (XANAX) 0.5 MG tablet Take 1 tablet by mouth 3 times daily as needed for Anxiety 60 tablet 0    vardenafil (LEVITRA) 20 MG tablet Take 1 tablet by mouth as needed for Erectile Dysfunction (take 30mins to one hour before sex) 20 tablet 3    PRILOSEC OTC

## 2023-11-27 ENCOUNTER — HOSPITAL ENCOUNTER (OUTPATIENT)
Dept: WOUND CARE | Age: 78
Discharge: HOME | End: 2023-11-27
Payer: MEDICARE

## 2023-11-27 VITALS
DIASTOLIC BLOOD PRESSURE: 82 MMHG | HEIGHT: 70 IN | WEIGHT: 195 LBS | BODY MASS INDEX: 27.92 KG/M2 | TEMPERATURE: 98.3 F | HEART RATE: 67 BPM | RESPIRATION RATE: 18 BRPM | SYSTOLIC BLOOD PRESSURE: 166 MMHG

## 2023-11-27 DIAGNOSIS — I73.9 PVD (PERIPHERAL VASCULAR DISEASE) (HCC): ICD-10-CM

## 2023-11-27 DIAGNOSIS — L97.922 SKIN ULCER OF LEFT LOWER LEG WITH FAT LAYER EXPOSED (HCC): Primary | ICD-10-CM

## 2023-11-27 PROCEDURE — 97597 DBRDMT OPN WND 1ST 20 CM/<: CPT

## 2023-11-27 PROCEDURE — 97597 DBRDMT OPN WND 1ST 20 CM/<: CPT | Performed by: NURSE PRACTITIONER

## 2023-11-27 NOTE — PATIENT INSTRUCTIONS
[address] and Hyperbaric Oxygen Therapy   Physician Orders and Discharge Instructions  1830 [facility],[address] [place] [address], [address]  Telephone: [phone]    NAME:  Jeanette Rodriguez OF BIRTH:  1945  MEDICAL RECORD NUMBER:  [id number]  DATE:  11/27/2023    Discharge condition: Stable    Discharge to: Home    Left via:Private automobile    Accompanied by:  self    ECF/HHA: [facility]    Dressing Orders:  Left leg: soap and water wash, apply aqua gabrielle AG on the wound bed, secure with dry gauze, and rolled gauze, ace wrap daily  I will order an antibiotic spray and apply that before the Aqua gabrielle AG    Treatment Orders:  Protein rich diet (unless restricted by your physician); Multivitamin daily; Elevate legs above the level of your heart when sitting 3-4 times daily for at least one hour each time, avoid standing for long periods of time. [facility] follow up visit __2 weeks with Kasia___________________________  (Please note your next appointment above and if you are unable to keep, kindly give a 24 hour notice. Thank you.)          If you experience any of the following, please call the [address] [place] [address] during business hours:    * Increase in Pain  * Temperature over 101  * Increase in drainage from your wound  * Drainage with a foul odor  * Bleeding  * Increase in swelling  * Need for compression bandage changes due to slippage, breakthrough drainage. If you need medical attention outside of the business hours of the [address] [place] [address] please contact your PCP or go to the nearest emergency room.

## 2023-11-27 NOTE — PLAN OF CARE
Problem: Wound:  Goal: Will show signs of wound healing; wound closure and no evidence of infection  Description: Will show signs of wound healing; wound closure and no evidence of infection  Outcome: Progressing     Problem: Compression therapy:  Goal: Will be free from complications associated with compression therapy  Description: Will be free from complications associated with compression therapy  Outcome: Progressing

## 2023-11-27 NOTE — PROGRESS NOTES
[address]   Progress Note and Procedure Note      Heike Mckeon RECORD NUMBER:  [id number]  AGE: 69 y.o. GENDER: male  : 1945  EPISODE DATE:  2023    Subjective:     Chief Complaint   Patient presents with    Wound Check     Left leg wound      HISTORY of PRESENT ILLNESS HPI     Alpesh Sharma is a 69 y.o. male who presents today for wound/ulcer evaluation. History of Wound Context: left leg wound follow up/eval and treat    Ulcer Identification:  Ulcer Type: venous  Contributing Factors:  infection    Wound: Abrasion        PAST MEDICAL HISTORY        Diagnosis Date    Hepatitis A 1970    Hyperlipidemia     mild    Hypertension     Panic attacks        PAST SURGICAL HISTORY    Past Surgical History:   Procedure Laterality Date    COLONOSCOPY  2004    int. hemorrhoids,     HIP ARTHROPLASTY Right     VASCULAR SURGERY      left leg       FAMILY HISTORY    History reviewed. No pertinent family history.     SOCIAL HISTORY    Social History     Tobacco Use    Smoking status: Never    Smokeless tobacco: Never   Substance Use Topics    Alcohol use: Yes    Drug use: No       ALLERGIES    Allergies   Allergen Reactions    Norco [Hydrocodone-Acetaminophen] Other (See Comments)     insomnia       MEDICATIONS    Current Outpatient Medications on File Prior to Encounter   Medication Sig Dispense Refill    doxycycline monohydrate (MONODOX) 100 MG capsule Take 1 capsule by mouth 2 times daily 60 capsule 0    lisinopril (PRINIVIL;ZESTRIL) 20 MG tablet Take 1 tablet by mouth daily      Multiple Vitamins-Minerals (THERAPEUTIC MULTIVITAMIN-MINERALS) tablet Take 1 tablet by mouth daily      cetirizine (ZYRTEC) 10 MG tablet Take 1 tablet by mouth daily      allopurinol (ZYLOPRIM) 300 MG tablet Take 1 tablet by mouth daily 30 tablet 5    ALPRAZolam (XANAX) 0.5 MG tablet Take 1 tablet by mouth 3 times daily as needed for Anxiety 60 tablet 0    vardenafil (LEVITRA) 20 MG

## 2023-11-27 NOTE — DISCHARGE INSTRUCTIONS
[address] and Hyperbaric Oxygen Therapy   Physician Orders and Discharge Instructions  1830 [facility],[address], [address]  Telephone: [phone]    NAME:  Paty Carrion OF BIRTH:  1945  MEDICAL RECORD NUMBER:  [id number]  DATE:  2023    Discharge condition: {STABLE/UNSTABLE:153825605}    Discharge to: {CHP Wound Discharge To:58699}    Left via:{Left FB}    Accompanied by:  {:[id number]}    ECF/HHA:     Dressing Orders:  Left leg: soap and water wash, apply aqua gabrielle AG on the wound bed, secure with dry gauze, and rolled gauze, ace wrap daily  I will order an antibiotic spray and apply that before the Aqua gabrielle AG    Treatment Orders:  Protein rich diet (unless restricted by your physician); Multivitamin daily; Elevate legs above the level of your heart when sitting 3-4 times daily for at least one hour each time, avoid standing for long periods of time. [facility] follow up visit _____________________________  (Please note your next appointment above and if you are unable to keep, kindly give a 24 hour notice. Thank you.)          If you experience any of the following, please call the [employer] during business hours:    * Increase in Pain  * Temperature over 101  * Increase in drainage from your wound  * Drainage with a foul odor  * Bleeding  * Increase in swelling  * Need for compression bandage changes due to slippage, breakthrough drainage. If you need medical attention outside of the business hours of the [facility] please contact your PCP or go to the nearest emergency room.

## 2023-12-11 ENCOUNTER — HOSPITAL ENCOUNTER (OUTPATIENT)
Dept: WOUND CARE | Age: 78
Discharge: HOME | End: 2023-12-11
Payer: MEDICARE

## 2023-12-11 VITALS
WEIGHT: 195 LBS | BODY MASS INDEX: 27.92 KG/M2 | SYSTOLIC BLOOD PRESSURE: 155 MMHG | DIASTOLIC BLOOD PRESSURE: 80 MMHG | RESPIRATION RATE: 18 BRPM | HEART RATE: 65 BPM | HEIGHT: 70 IN | TEMPERATURE: 98 F

## 2023-12-11 DIAGNOSIS — L97.922 SKIN ULCER OF LEFT LOWER LEG WITH FAT LAYER EXPOSED (HCC): Primary | ICD-10-CM

## 2023-12-11 DIAGNOSIS — I73.9 PVD (PERIPHERAL VASCULAR DISEASE) (HCC): ICD-10-CM

## 2023-12-11 PROCEDURE — 97597 DBRDMT OPN WND 1ST 20 CM/<: CPT | Performed by: NURSE PRACTITIONER

## 2023-12-11 PROCEDURE — 97597 DBRDMT OPN WND 1ST 20 CM/<: CPT

## 2023-12-11 RX ORDER — LIDOCAINE 40 MG/G
CREAM TOPICAL ONCE
OUTPATIENT
Start: 2023-12-11 | End: 2023-12-11

## 2023-12-11 RX ORDER — LIDOCAINE HYDROCHLORIDE 20 MG/ML
JELLY TOPICAL ONCE
Status: COMPLETED | OUTPATIENT
Start: 2023-12-11 | End: 2023-12-11

## 2023-12-11 RX ORDER — TRIAMCINOLONE ACETONIDE 1 MG/G
OINTMENT TOPICAL ONCE
OUTPATIENT
Start: 2023-12-11 | End: 2023-12-11

## 2023-12-11 RX ORDER — LIDOCAINE HYDROCHLORIDE 10 MG/ML
5 INJECTION, SOLUTION INFILTRATION; PERINEURAL ONCE
Start: 2023-12-11 | End: 2023-12-11

## 2023-12-11 RX ORDER — LIDOCAINE HYDROCHLORIDE 40 MG/ML
SOLUTION TOPICAL ONCE
OUTPATIENT
Start: 2023-12-11 | End: 2023-12-11

## 2023-12-11 RX ORDER — LIDOCAINE HYDROCHLORIDE 20 MG/ML
JELLY TOPICAL ONCE
OUTPATIENT
Start: 2023-12-11 | End: 2023-12-11

## 2023-12-11 RX ORDER — CLOBETASOL PROPIONATE 0.5 MG/G
OINTMENT TOPICAL ONCE
OUTPATIENT
Start: 2023-12-11 | End: 2023-12-11

## 2023-12-11 RX ORDER — GENTAMICIN SULFATE 1 MG/G
OINTMENT TOPICAL ONCE
OUTPATIENT
Start: 2023-12-11 | End: 2023-12-11

## 2023-12-11 RX ORDER — BETAMETHASONE DIPROPIONATE 0.05 %
OINTMENT (GRAM) TOPICAL ONCE
OUTPATIENT
Start: 2023-12-11 | End: 2023-12-11

## 2023-12-11 RX ORDER — BACITRACIN ZINC AND POLYMYXIN B SULFATE 500; 1000 [USP'U]/G; [USP'U]/G
OINTMENT TOPICAL ONCE
OUTPATIENT
Start: 2023-12-11 | End: 2023-12-11

## 2023-12-11 RX ORDER — IBUPROFEN 200 MG
TABLET ORAL ONCE
OUTPATIENT
Start: 2023-12-11 | End: 2023-12-11

## 2023-12-11 RX ORDER — GINSENG 100 MG
CAPSULE ORAL ONCE
OUTPATIENT
Start: 2023-12-11 | End: 2023-12-11

## 2023-12-11 RX ORDER — DOXYCYCLINE 100 MG/1
100 CAPSULE ORAL 2 TIMES DAILY
Qty: 60 CAPSULE | Refills: 0 | Status: SHIPPED | OUTPATIENT
Start: 2023-12-11 | End: 2024-01-10

## 2023-12-11 RX ORDER — LIDOCAINE 50 MG/G
OINTMENT TOPICAL ONCE
OUTPATIENT
Start: 2023-12-11 | End: 2023-12-11

## 2023-12-11 RX ADMIN — LIDOCAINE HYDROCHLORIDE: 20 JELLY TOPICAL at 09:16

## 2023-12-11 NOTE — PROGRESS NOTES
[address]   Progress Note and Procedure Note      Heike Mckeon RECORD NUMBER:  [id number]  AGE: 69 y.o. GENDER: male  : 1945  EPISODE DATE:  2023    Subjective:     Chief Complaint   Patient presents with    Wound Check     Patient presents today for recheck left leg wounds. HISTORY of PRESENT ILLNESS HPI     Jono Roach is a 69 y.o. male who presents today for wound/ulcer evaluation. History of Wound Context: left leg wound follow up/eval and treat    Ulcer Identification:  Ulcer Type: venous  Contributing Factors: venous stasis    Wound: N/A        PAST MEDICAL HISTORY        Diagnosis Date    Hepatitis A     Hyperlipidemia     mild    Hypertension     Panic attacks        PAST SURGICAL HISTORY    Past Surgical History:   Procedure Laterality Date    COLONOSCOPY  2004    int. hemorrhoids,     HIP ARTHROPLASTY Right     VASCULAR SURGERY      left leg       FAMILY HISTORY    History reviewed. No pertinent family history.     SOCIAL HISTORY    Social History     Tobacco Use    Smoking status: Never    Smokeless tobacco: Never   Substance Use Topics    Alcohol use: Yes    Drug use: No       ALLERGIES    Allergies   Allergen Reactions    Norco [Hydrocodone-Acetaminophen] Other (See Comments)     insomnia       MEDICATIONS    Current Outpatient Medications on File Prior to Encounter   Medication Sig Dispense Refill    doxycycline monohydrate (MONODOX) 100 MG capsule Take 1 capsule by mouth 2 times daily 60 capsule 0    lisinopril (PRINIVIL;ZESTRIL) 20 MG tablet Take 1 tablet by mouth daily      Multiple Vitamins-Minerals (THERAPEUTIC MULTIVITAMIN-MINERALS) tablet Take 1 tablet by mouth daily      cetirizine (ZYRTEC) 10 MG tablet Take 1 tablet by mouth daily      allopurinol (ZYLOPRIM) 300 MG tablet Take 1 tablet by mouth daily 30 tablet 5    ALPRAZolam (XANAX) 0.5 MG tablet Take 1 tablet by mouth 3 times daily as needed for Anxiety 60

## 2023-12-11 NOTE — PATIENT INSTRUCTIONS
[address] and Hyperbaric Oxygen Therapy   Physician Orders and Discharge Instructions  1830 [facility],[address] [place] [address] Blvd, [address]  Telephone: [phone]    NAME:  Michelle Hinton OF BIRTH:  1945  MEDICAL RECORD NUMBER:  [id number]  DATE:  12/11/23    Discharge condition: Stable    Discharge to: Home    Left via:Private automobile    Accompanied by:  self    ECF/HHA: [place]    Take Doxycycline as directed    Dressing Orders:  Left leg: soap and water wash, apply aqua gabrielle AG on the wound bed, secure with dry gauze, and rolled gauze, ace wrap daily      Treatment Orders:  Protein rich diet (unless restricted by your physician); Multivitamin daily; Elevate legs above the level of your heart when sitting 3-4 times daily for at least one hour each time, avoid standing for long periods of time. [facility] follow up visit __2  1/2 weeks with Kasia___________________________  (Please note your next appointment above and if you are unable to keep, kindly give a 24 hour notice. Thank you.)          If you experience any of the following, please call the [employer] during business hours:    * Increase in Pain  * Temperature over 101  * Increase in drainage from your wound  * Drainage with a foul odor  * Bleeding  * Increase in swelling  * Need for compression bandage changes due to slippage, breakthrough drainage. If you need medical attention outside of the business hours of the [facility] [employer] please contact your PCP or go to the nearest emergency room.

## 2023-12-28 ENCOUNTER — HOSPITAL ENCOUNTER (OUTPATIENT)
Dept: WOUND CARE | Age: 78
Discharge: HOME | End: 2023-12-28
Payer: MEDICARE

## 2023-12-28 VITALS
WEIGHT: 195 LBS | DIASTOLIC BLOOD PRESSURE: 89 MMHG | RESPIRATION RATE: 18 BRPM | BODY MASS INDEX: 27.92 KG/M2 | HEART RATE: 63 BPM | SYSTOLIC BLOOD PRESSURE: 183 MMHG | TEMPERATURE: 98.3 F | HEIGHT: 70 IN

## 2023-12-28 DIAGNOSIS — I73.9 PVD (PERIPHERAL VASCULAR DISEASE) (HCC): ICD-10-CM

## 2023-12-28 DIAGNOSIS — L97.922 SKIN ULCER OF LEFT LOWER LEG WITH FAT LAYER EXPOSED (HCC): Primary | ICD-10-CM

## 2023-12-28 PROCEDURE — 99212 OFFICE O/P EST SF 10 MIN: CPT

## 2023-12-28 PROCEDURE — 99212 OFFICE O/P EST SF 10 MIN: CPT | Performed by: NURSE PRACTITIONER

## 2023-12-28 RX ORDER — BETAMETHASONE DIPROPIONATE 0.05 %
OINTMENT (GRAM) TOPICAL ONCE
Status: CANCELLED | OUTPATIENT
Start: 2023-12-28 | End: 2023-12-28

## 2023-12-28 RX ORDER — LIDOCAINE HYDROCHLORIDE 20 MG/ML
JELLY TOPICAL ONCE
Status: CANCELLED | OUTPATIENT
Start: 2023-12-28 | End: 2023-12-28

## 2023-12-28 RX ORDER — BACITRACIN ZINC AND POLYMYXIN B SULFATE 500; 1000 [USP'U]/G; [USP'U]/G
OINTMENT TOPICAL ONCE
Status: CANCELLED | OUTPATIENT
Start: 2023-12-28 | End: 2023-12-28

## 2023-12-28 RX ORDER — LIDOCAINE 40 MG/G
CREAM TOPICAL ONCE
Status: CANCELLED | OUTPATIENT
Start: 2023-12-28 | End: 2023-12-28

## 2023-12-28 RX ORDER — LIDOCAINE HYDROCHLORIDE 40 MG/ML
SOLUTION TOPICAL ONCE
Status: CANCELLED | OUTPATIENT
Start: 2023-12-28 | End: 2023-12-28

## 2023-12-28 RX ORDER — LIDOCAINE HYDROCHLORIDE 20 MG/ML
JELLY TOPICAL ONCE
Status: COMPLETED | OUTPATIENT
Start: 2023-12-28 | End: 2023-12-28

## 2023-12-28 RX ORDER — LIDOCAINE 50 MG/G
OINTMENT TOPICAL ONCE
Status: CANCELLED | OUTPATIENT
Start: 2023-12-28 | End: 2023-12-28

## 2023-12-28 RX ORDER — GINSENG 100 MG
CAPSULE ORAL ONCE
Status: CANCELLED | OUTPATIENT
Start: 2023-12-28 | End: 2023-12-28

## 2023-12-28 RX ORDER — CLOBETASOL PROPIONATE 0.5 MG/G
OINTMENT TOPICAL ONCE
Status: CANCELLED | OUTPATIENT
Start: 2023-12-28 | End: 2023-12-28

## 2023-12-28 RX ORDER — LIDOCAINE HYDROCHLORIDE 10 MG/ML
5 INJECTION, SOLUTION INFILTRATION; PERINEURAL ONCE
Status: CANCELLED
Start: 2023-12-28 | End: 2023-12-28

## 2023-12-28 RX ORDER — IBUPROFEN 200 MG
TABLET ORAL ONCE
Status: CANCELLED | OUTPATIENT
Start: 2023-12-28 | End: 2023-12-28

## 2023-12-28 RX ORDER — TRIAMCINOLONE ACETONIDE 1 MG/G
OINTMENT TOPICAL ONCE
Status: CANCELLED | OUTPATIENT
Start: 2023-12-28 | End: 2023-12-28

## 2023-12-28 RX ORDER — GENTAMICIN SULFATE 1 MG/G
OINTMENT TOPICAL ONCE
Status: CANCELLED | OUTPATIENT
Start: 2023-12-28 | End: 2023-12-28

## 2023-12-28 RX ADMIN — LIDOCAINE HYDROCHLORIDE: 20 JELLY TOPICAL at 08:38

## 2023-12-28 NOTE — PATIENT INSTRUCTIONS
[address] and Hyperbaric Oxygen Therapy   Physician Orders and Discharge Instructions  1830 [facility],[address] [facility] [facility] [address], [address]  Telephone: [phone]    NAME:  Stephanie Chan OF BIRTH:  1945  MEDICAL RECORD NUMBER:  [id number]  DATE:  12/28/23    Discharge condition: Stable    Discharge to: Home    Left via:Private automobile    Accompanied by:  self    ECF/HHA:     1) Moisturize your  the left lower leg with a good thick cream like Eucerin Cream, Aquaphor or Cocoa Butter (in a jar) at least twice daily. Dry skin has a greater likelihood of breaking down if it is stretched by swelling. 2) Elevate legs when sitting, above the heart preferably. Avoid sleeping in a recliner. 3) Avoid standing for long periods of time. 4) Walking is good. 5)  Wear knee high graduated compression stockings 20 mmHg on both legs. (This is what prevents these ulcerations)  Put these on in the morning no more than 20 minutes after rising and remove before going to bed. You can hand wash and hang to dry overnight. We recommend having two pairs to rotate washing and wearing. 6) Be very careful with your legs. Often these are caused by a bump or scrape that doesn't heal well. Healing is prevented or slowed by your Venous insufficiency. Compression and keeping the swelling out of your legs is the key to the condition of your skin. 7) You can buy these stockings  at many pharmacies and even online, but you must have a calf and ankle measurement (taken in the morning before your legs swell) to order them. You will also need the strength that the physician has ordered. Most insurance companies do not pay for these unless you have an open ulceration currently. 8)  Replace your stockings every 6 months as they stretch out and become less effective. 9) If you are overweight, losing weight can be helpful.      [facility] follow up visit __as

## 2023-12-28 NOTE — PROGRESS NOTES
Wound Etiology Venous 12/28/23 0838   Dressing Status Dry 12/28/23 0838   Wound Cleansed Soap and water 12/28/23 2326   Dressing/Treatment Hydrofiber Ag;Gauze dressing/dressing sponge;Roll gauze;Tape/Soft cloth adhesive tape; Ace wrap 12/11/23 0934   Dressing Change Due 01/08/23 06/08/23 0934   Wound Length (cm) 0 cm 12/28/23 0838   Wound Width (cm) 0 cm 12/28/23 0838   Wound Depth (cm) 0 cm 12/28/23 0838   Wound Surface Area (cm^2) 0 cm^2 12/28/23 0838   Change in Wound Size % (l*w) 100 12/28/23 0838   Wound Volume (cm^3) 0 cm^3 12/28/23 0838   Wound Healing % 100 12/28/23 0838   Post-Procedure Length (cm) 0.5 cm 12/11/23 0924   Post-Procedure Width (cm) 0.8 cm 12/11/23 0924   Post-Procedure Depth (cm) 0.1 cm 12/11/23 0924   Post-Procedure Surface Area (cm^2) 0.4 cm^2 12/11/23 0924   Post-Procedure Volume (cm^3) 0.04 cm^3 12/11/23 0924   Distance Tunneling (cm) 0 cm 12/28/23 0838   Tunneling Position ___ O'Clock 0 12/28/23 0838   Undermining Starts ___ O'Clock 0 12/28/23 0838   Undermining Ends___ O'Clock 0 12/28/23 0838   Undermining Maxium Distance (cm) 0 12/28/23 0838   Wound Assessment Eschar dry 12/28/23 0838   Drainage Amount Scant (moist but unmeasurable) 12/28/23 0838   Drainage Description Serosanguinous 12/28/23 0838   Odor None 12/28/23 0838   Tammy-wound Assessment Blanchable erythema 12/28/23 0838   Margins Defined edges 12/28/23 0838   Wound Thickness Description not for Pressure Injury Full thickness 12/28/23 0838   Number of days: 203       Wound 11/13/23 Pretibial Distal;Left Wound #3 Left distal pretibial (venous) (Active)   Wound Image   12/28/23 0838   Wound Etiology Venous 12/28/23 0838   Dressing Status Dry 12/28/23 0838   Wound Cleansed Soap and water 12/28/23 8654   Dressing/Treatment Hydrofiber Ag;Gauze dressing/dressing sponge;Roll gauze;Tape/Soft cloth adhesive tape; Ace wrap 12/11/23 0934   Wound Length (cm) 0 cm 12/28/23 0838   Wound Width (cm) 0 cm 12/28/23 0838   Wound Depth (cm) 0 cm

## 2024-05-12 NOTE — PROGRESS NOTES
Therapy: No    Written patient dismissal instructions given to patient and signed by patient or POA. Mr. Perez Fillers is healing well, I took a biopsy today of the difficult to heal tissue, follow up in 2 weeks.   Electronically signed by WAYNE Murillo CNP on 9/21/2023 at 10:56 AM 97

## 2024-09-04 ENCOUNTER — TELEPHONE (OUTPATIENT)
Dept: FAMILY MEDICINE CLINIC | Facility: CLINIC | Age: 79
End: 2024-09-04
Payer: MEDICARE

## 2024-09-04 NOTE — TELEPHONE ENCOUNTER
Chidi called and said that he had an appt with you at [facility] at the beginning of August and he had received a call about his labs and he wasn't sure if it was resulted by you or someone else- He said they told him to change one of his meds- I will call and see if she can look and tell me which it was- that sound good?

## 2024-09-06 NOTE — TELEPHONE ENCOUNTER
Called [facility] again about latest labs- she said she will get them faxed over today

## 2024-10-07 ENCOUNTER — OFFICE VISIT (OUTPATIENT)
Dept: FAMILY MEDICINE CLINIC | Facility: CLINIC | Age: 79
End: 2024-10-07
Payer: MEDICARE

## 2024-10-07 VITALS
HEIGHT: 71 IN | SYSTOLIC BLOOD PRESSURE: 120 MMHG | DIASTOLIC BLOOD PRESSURE: 72 MMHG | OXYGEN SATURATION: 97 % | HEART RATE: 68 BPM | BODY MASS INDEX: 28.36 KG/M2 | TEMPERATURE: 99.3 F | WEIGHT: 202.6 LBS

## 2024-10-07 DIAGNOSIS — B02.9 HERPES ZOSTER WITHOUT COMPLICATION: Primary | ICD-10-CM

## 2024-10-07 PROCEDURE — 99203 OFFICE O/P NEW LOW 30 MIN: CPT | Performed by: NURSE PRACTITIONER

## 2024-10-07 PROCEDURE — 1159F MED LIST DOCD IN RCRD: CPT | Performed by: NURSE PRACTITIONER

## 2024-10-07 PROCEDURE — 1126F AMNT PAIN NOTED NONE PRSNT: CPT | Performed by: NURSE PRACTITIONER

## 2024-10-07 PROCEDURE — 1160F RVW MEDS BY RX/DR IN RCRD: CPT | Performed by: NURSE PRACTITIONER

## 2024-10-07 RX ORDER — M-VIT,TX,IRON,MINS/CALC/FOLIC 27MG-0.4MG
1 TABLET ORAL DAILY
COMMUNITY

## 2024-10-07 RX ORDER — ALLOPURINOL 300 MG/1
1 TABLET ORAL DAILY
COMMUNITY

## 2024-10-07 RX ORDER — LISINOPRIL 40 MG/1
1 TABLET ORAL DAILY
COMMUNITY

## 2024-10-07 RX ORDER — ASPIRIN 81 MG/1
81 TABLET ORAL DAILY
COMMUNITY

## 2024-10-07 RX ORDER — VALACYCLOVIR HYDROCHLORIDE 1 G/1
1000 TABLET, FILM COATED ORAL 3 TIMES DAILY
Qty: 21 TABLET | Refills: 0 | Status: SHIPPED | OUTPATIENT
Start: 2024-10-07

## 2024-10-07 RX ORDER — CETIRIZINE HYDROCHLORIDE 10 MG/1
1 TABLET ORAL DAILY
COMMUNITY

## 2024-10-07 RX ORDER — AMLODIPINE BESYLATE 2.5 MG/1
1 TABLET ORAL DAILY
COMMUNITY

## 2024-10-07 NOTE — PROGRESS NOTES
"CC: bites on back, aching all over    History:  Chidi Aguilera is a 79 y.o. male who presents today for evaluation of the above problems.      Patient states that he hasn't felt well for a few days - body aches, chills, fatigue. When he got up this morning he noticed 2 \"bites\" on his back.   Temp is 99.3 today. States that he feels achy all over, but he has recently stopped taking his diclofenac due to reduced kidney function, so he is unsure if this could be why he feels so achy.     HPI  ROS:  Review of Systems   Constitutional:  Positive for chills and fatigue.   Musculoskeletal:  Positive for arthralgias and myalgias.   Skin:         \"Two bites on back\"       Allergies   Allergen Reactions    Tetracyclines & Related Rash     Past Medical History:   Diagnosis Date    Arthritis     Gout     Hypertension      Past Surgical History:   Procedure Laterality Date    HIP SURGERY Right      History reviewed. No pertinent family history.   reports that he has never smoked. He has never been exposed to tobacco smoke. He has never used smokeless tobacco. He reports current alcohol use. He reports that he does not use drugs.      Current Outpatient Medications:     omeprazole (priLOSEC) 20 MG capsule, Take 1 capsule by mouth Daily., Disp: , Rfl:     allopurinol (ZYLOPRIM) 300 MG tablet, Take 1 tablet by mouth Daily., Disp: , Rfl:     amLODIPine (NORVASC) 2.5 MG tablet, Take 1 tablet by mouth Daily., Disp: , Rfl:     aspirin (ASPIR) 81 MG EC tablet, Take 1 tablet by mouth Daily., Disp: , Rfl:     cetirizine (zyrTEC) 10 MG tablet, Take 1 tablet by mouth Daily., Disp: , Rfl:     lisinopril (PRINIVIL,ZESTRIL) 40 MG tablet, Take 1 tablet by mouth Daily., Disp: , Rfl:     multivitamin with minerals (therapeutic multivitamin-minerals) tablet tablet, Take 1 tablet by mouth Daily., Disp: , Rfl:     valACYclovir (Valtrex) 1000 MG tablet, Take 1 tablet by mouth 3 (Three) Times a Day., Disp: 21 tablet, Rfl: 0    OBJECTIVE:  /72 " "(BP Location: Left arm, Patient Position: Sitting, Cuff Size: Adult)   Pulse 68   Temp 99.3 °F (37.4 °C) (Temporal)   Ht 180.3 cm (71\")   Wt 91.9 kg (202 lb 9.6 oz)   SpO2 97%   BMI 28.26 kg/m²    Physical Exam  Vitals reviewed.   Constitutional:       Appearance: He is well-developed.   Cardiovascular:      Rate and Rhythm: Normal rate.   Pulmonary:      Effort: Pulmonary effort is normal.   Skin:     Comments: Macular rash with a few small vesicles noted on Left side of thoracic back. Additional lesions noted on left side of abdomen and left rib area.    Neurological:      Mental Status: He is alert and oriented to person, place, and time.   Psychiatric:         Behavior: Behavior normal.       Assessment/Plan    Diagnoses and all orders for this visit:    1. Herpes zoster without complication (Primary)  -     valACYclovir (Valtrex) 1000 MG tablet; Take 1 tablet by mouth 3 (Three) Times a Day.  Dispense: 21 tablet; Refill: 0        An After Visit Summary was printed and given to the patient at discharge.  Return if symptoms worsen or fail to improve, for Next scheduled follow up.       OSMIN [place] 10/7/24    Electronically signed.  "

## 2024-10-07 NOTE — PROGRESS NOTES
"CC:    History:  Chidi Aguilera is a 79 y.o. male who presents today for evaluation of the above problems.      Ache all over. Haven't been sleeping well.   HPI  ROS:  Review of Systems    Allergies   Allergen Reactions    Tetracyclines & Related Rash     Past Medical History:   Diagnosis Date    Arthritis     Gout     Hypertension      Past Surgical History:   Procedure Laterality Date    HIP SURGERY Right      No family history on file.   reports that he has never smoked. He has never been exposed to tobacco smoke. He has never used smokeless tobacco. He reports current alcohol use. He reports that he does not use drugs.      Current Outpatient Medications:     omeprazole (priLOSEC) 20 MG capsule, Take 1 capsule by mouth Daily., Disp: , Rfl:     allopurinol (ZYLOPRIM) 300 MG tablet, Take 1 tablet by mouth Daily., Disp: , Rfl:     amLODIPine (NORVASC) 2.5 MG tablet, Take 1 tablet by mouth Daily., Disp: , Rfl:     aspirin (ASPIR) 81 MG EC tablet, Take 1 tablet by mouth Daily., Disp: , Rfl:     cetirizine (zyrTEC) 10 MG tablet, Take 1 tablet by mouth Daily., Disp: , Rfl:     lisinopril (PRINIVIL,ZESTRIL) 40 MG tablet, Take 1 tablet by mouth Daily., Disp: , Rfl:     multivitamin with minerals (therapeutic multivitamin-minerals) tablet tablet, Take 1 tablet by mouth Daily., Disp: , Rfl:     valACYclovir (Valtrex) 1000 MG tablet, Take 1 tablet by mouth 3 (Three) Times a Day., Disp: 21 tablet, Rfl: 0    OBJECTIVE:  /72 (BP Location: Left arm, Patient Position: Sitting, Cuff Size: Adult)   Pulse 68   Temp 99.3 °F (37.4 °C) (Temporal)   Ht 180.3 cm (71\")   Wt 91.9 kg (202 lb 9.6 oz)   SpO2 97%   BMI 28.26 kg/m²    Physical Exam    Assessment/Plan    There are no diagnoses linked to this encounter.    An After Visit Summary was printed and given to the patient at discharge.  No follow-ups on file.           Electronically signed.  "

## 2024-10-16 ENCOUNTER — OFFICE VISIT (OUTPATIENT)
Dept: FAMILY MEDICINE CLINIC | Facility: CLINIC | Age: 79
End: 2024-10-16
Payer: MEDICARE

## 2024-10-16 VITALS
HEART RATE: 85 BPM | HEIGHT: 71 IN | SYSTOLIC BLOOD PRESSURE: 157 MMHG | TEMPERATURE: 98.6 F | DIASTOLIC BLOOD PRESSURE: 90 MMHG | BODY MASS INDEX: 27.24 KG/M2 | WEIGHT: 194.6 LBS | OXYGEN SATURATION: 100 %

## 2024-10-16 DIAGNOSIS — B02.29 HZV (HERPES ZOSTER VIRUS) POST HERPETIC NEURALGIA: Primary | ICD-10-CM

## 2024-10-16 DIAGNOSIS — R63.0 DECREASE IN APPETITE: ICD-10-CM

## 2024-10-16 DIAGNOSIS — R63.4 WEIGHT LOSS: ICD-10-CM

## 2024-10-16 DIAGNOSIS — Z51.81 THERAPEUTIC DRUG MONITORING: ICD-10-CM

## 2024-10-16 PROCEDURE — 1125F AMNT PAIN NOTED PAIN PRSNT: CPT | Performed by: NURSE PRACTITIONER

## 2024-10-16 PROCEDURE — 99213 OFFICE O/P EST LOW 20 MIN: CPT | Performed by: NURSE PRACTITIONER

## 2024-10-16 RX ORDER — ALPRAZOLAM 0.5 MG
0.5 TABLET ORAL 2 TIMES DAILY PRN
COMMUNITY

## 2024-10-16 RX ORDER — GABAPENTIN 100 MG/1
100 CAPSULE ORAL 3 TIMES DAILY
Qty: 21 CAPSULE | Refills: 0 | Status: SHIPPED | OUTPATIENT
Start: 2024-10-16 | End: 2024-10-23

## 2024-10-16 NOTE — PROGRESS NOTES
CC:   Chief Complaint   Patient presents with    Herpes Zoster     Patient states shingles has spread and he is in a great deal of pain        History:  Chidi Aguilera is a 79 y.o. male who presents today for evaluation of the above problems.      HPI  Patient presents today with c/o shingles pain. States he was treated with Valtrex last Monday and completed that prescription. He didn't have a lot of pain at that time but yesterday and today has noticed more severe, sharp pain along his shingles rash. Taking Tylenol with no relief in symptoms. Patient seems very weak today and has lost about 8 lbs in a little over a week. States he has no appetite and not eating. I had patient eat peanut butter crackers and drink water while in clinic- discussed concern for dehydration and nutrition status. Patient advised to eat small meals- even if not hungry and drink some protein shakes to help.     Allergies   Allergen Reactions    Tetracyclines & Related Rash     Past Medical History:   Diagnosis Date    Arthritis     Gout     Hypertension      Past Surgical History:   Procedure Laterality Date    HIP SURGERY Right      History reviewed. No pertinent family history.   reports that he has never smoked. He has never been exposed to tobacco smoke. He has never used smokeless tobacco. He reports current alcohol use. He reports that he does not use drugs.      Current Outpatient Medications:     allopurinol (ZYLOPRIM) 300 MG tablet, Take 1 tablet by mouth Daily., Disp: , Rfl:     amLODIPine (NORVASC) 2.5 MG tablet, Take 1 tablet by mouth Daily., Disp: , Rfl:     aspirin (ASPIR) 81 MG EC tablet, Take 1 tablet by mouth Daily., Disp: , Rfl:     cetirizine (zyrTEC) 10 MG tablet, Take 1 tablet by mouth Daily., Disp: , Rfl:     lisinopril (PRINIVIL,ZESTRIL) 40 MG tablet, Take 1 tablet by mouth Daily., Disp: , Rfl:     multivitamin with minerals (therapeutic multivitamin-minerals) tablet tablet, Take 1 tablet by mouth Daily., Disp: ,  "Rfl:     omeprazole (priLOSEC) 20 MG capsule, Take 1 capsule by mouth Daily., Disp: , Rfl:     ALPRAZolam (XANAX) 0.5 MG tablet, Take 1 tablet by mouth 2 (Two) Times a Day As Needed for Anxiety., Disp: , Rfl:     gabapentin (NEURONTIN) 100 MG capsule, Take 1 capsule by mouth 3 (Three) Times a Day for 7 days., Disp: 21 capsule, Rfl: 0    OBJECTIVE:  /90 (BP Location: Left arm, Patient Position: Sitting, Cuff Size: Adult) Comment: has not taken meds this morning  Pulse 85   Temp 98.6 °F (37 °C) (Temporal)   Ht 180.3 cm (71\")   Wt 88.3 kg (194 lb 9.6 oz)   SpO2 100%   BMI 27.14 kg/m²    Estimated body mass index is 27.14 kg/m² as calculated from the following:    Height as of this encounter: 180.3 cm (71\").    Weight as of this encounter: 88.3 kg (194 lb 9.6 oz).                  Physical Exam  Vitals reviewed.   Constitutional:       Appearance: Normal appearance.      Comments: Weak appearing   HENT:      Head: Normocephalic and atraumatic.   Cardiovascular:      Rate and Rhythm: Normal rate and regular rhythm.      Heart sounds: Normal heart sounds.   Pulmonary:      Effort: No respiratory distress.      Breath sounds: Normal breath sounds. No wheezing.   Skin:     Comments: Shingles rash along left thoracic back- dry, scabbing over, no new vesicles noted   Neurological:      Mental Status: He is alert and oriented to person, place, and time.      Gait: Gait abnormal (unsteady).   Psychiatric:         Mood and Affect: Mood normal.         Behavior: Behavior normal.         Thought Content: Thought content normal.              Assessment/Plan    Diagnoses and all orders for this visit:    1. HZV (herpes zoster virus) post herpetic neuralgia (Primary)  -     gabapentin (NEURONTIN) 100 MG capsule; Take 1 capsule by mouth 3 (Three) Times a Day for 7 days.  Dispense: 21 capsule; Refill: 0    2. Therapeutic drug monitoring  -     ToxAssure Flex 23, Ur - Urine, Clean Catch    3. Decrease in appetite    4. " Weight loss    William reviewed and appropriate. UDS done today. CSA reviewed and signed.   Medication SE discussed - patient to call if any SE or concerns or if pain is not controlled. Continue tylenol. F/u 1 week.   Advised to drink protein shakes since no appetite and not eating. Encouraged small frequent meals.   Advised to take his medication when he gets home- has not taken BP medication today-            An After Visit Summary was printed and given to the patient at discharge.  Return in about 1 week (around 10/23/2024) for Recheck shingles pain and weight loss.

## 2024-10-23 ENCOUNTER — OFFICE VISIT (OUTPATIENT)
Dept: FAMILY MEDICINE CLINIC | Facility: CLINIC | Age: 79
End: 2024-10-23
Payer: MEDICARE

## 2024-10-23 VITALS
OXYGEN SATURATION: 99 % | TEMPERATURE: 98.6 F | DIASTOLIC BLOOD PRESSURE: 81 MMHG | HEART RATE: 74 BPM | WEIGHT: 199.6 LBS | SYSTOLIC BLOOD PRESSURE: 169 MMHG | HEIGHT: 71 IN | BODY MASS INDEX: 27.94 KG/M2

## 2024-10-23 DIAGNOSIS — B02.29 HZV (HERPES ZOSTER VIRUS) POST HERPETIC NEURALGIA: Primary | ICD-10-CM

## 2024-10-23 DIAGNOSIS — Z23 NEED FOR COVID-19 VACCINE: ICD-10-CM

## 2024-10-23 DIAGNOSIS — Z23 NEED FOR INFLUENZA VACCINATION: ICD-10-CM

## 2024-10-23 DIAGNOSIS — K59.00 CONSTIPATION, UNSPECIFIED CONSTIPATION TYPE: ICD-10-CM

## 2024-10-23 LAB
1OH-MIDAZOLAM UR QL SCN: NOT DETECTED NG/MG CREAT
6MAM UR QL SCN: NEGATIVE NG/ML
7AMINOCLONAZEPAM/CREAT UR: NOT DETECTED NG/MG CREAT
A-OH ALPRAZ/CREAT UR: 319 NG/MG CREAT
A-OH-TRIAZOLAM/CREAT UR CFM: NOT DETECTED NG/MG CREAT
ACP UR QL CFM: NOT DETECTED
ALPRAZ/CREAT UR CFM: 88 NG/MG CREAT
AMPHETAMINES UR QL SCN: NEGATIVE NG/ML
APAP UR QL SCN: NORMAL UG/ML
APAP UR QL: NORMAL
APAP UR-MCNC: PRESENT UG/ML
BARBITURATES UR QL SCN: NEGATIVE NG/ML
BENZODIAZ SCN METH UR: NORMAL
BUPRENORPHINE UR QL SCN: NEGATIVE
BUPRENORPHINE/CREAT UR: NOT DETECTED NG/MG CREAT
CANNABINOIDS UR QL SCN: NEGATIVE NG/ML
CARISOPRODOL UR QL: NEGATIVE NG/ML
CLONAZEPAM/CREAT UR CFM: NOT DETECTED NG/MG CREAT
COCAINE+BZE UR QL SCN: NEGATIVE NG/ML
CREAT UR-MCNC: 154 MG/DL
D-METHORPHAN UR-MCNC: NOT DETECTED NG/ML
D-METHORPHAN+LEVORPHANOL UR QL: NOT DETECTED
DESALKYLFLURAZ/CREAT UR: NOT DETECTED NG/MG CREAT
DIAZEPAM/CREAT UR: NOT DETECTED NG/MG CREAT
ETHANOL UR QL SCN: NEGATIVE G/DL
ETHANOL UR QL SCN: NEGATIVE NG/ML
FENTANYL CTO UR SCN-MCNC: NEGATIVE NG/ML
FENTANYL/CREAT UR: NOT DETECTED NG/MG CREAT
FLUNITRAZEPAM UR QL SCN: NOT DETECTED NG/MG CREAT
GABAPENTIN UR-MCNC: NEGATIVE UG/ML
HALLUCINOGENS UR: NEGATIVE
HYPNOTICS UR QL SCN: NEGATIVE
KETAMINE UR QL: NOT DETECTED
LORAZEPAM/CREAT UR: NOT DETECTED NG/MG CREAT
MEPERIDINE UR QL SCN: NEGATIVE NG/ML
METHADONE UR QL SCN: NEGATIVE NG/ML
METHADONE+METAB UR QL SCN: NEGATIVE NG/ML
MIDAZOLAM/CREAT UR CFM: NOT DETECTED NG/MG CREAT
MISCELLANEOUS, UR: NEGATIVE
NORBUPRENORPHINE/CREAT UR: NOT DETECTED NG/MG CREAT
NORDIAZEPAM/CREAT UR: NOT DETECTED NG/MG CREAT
NORFENTANYL/CREAT UR: NOT DETECTED NG/MG CREAT
NORFLUNITRAZEPAM UR-MCNC: NOT DETECTED NG/MG CREAT
NORKETAMINE UR-MCNC: NOT DETECTED UG/ML
OPIATES UR SCN-MCNC: NEGATIVE NG/ML
OXAZEPAM/CREAT UR: NOT DETECTED NG/MG CREAT
OXYCODONE CTO UR SCN-MCNC: NEGATIVE NG/ML
PCP UR QL SCN: NEGATIVE NG/ML
PRESCRIBED MEDICATIONS: NORMAL
PROPOXYPH UR QL SCN: NEGATIVE NG/ML
TAPENTADOL CTO UR SCN-MCNC: NEGATIVE NG/ML
TEMAZEPAM/CREAT UR: NOT DETECTED NG/MG CREAT
TRAMADOL UR QL SCN: NEGATIVE NG/ML
ZALEPLON UR-MCNC: NOT DETECTED NG/ML
ZOLPIDEM PHENYL-4-CARB UR QL SCN: NOT DETECTED
ZOLPIDEM UR QL SCN: NOT DETECTED
ZOPICLONE-N-OXIDE UR-MCNC: NOT DETECTED NG/ML

## 2024-10-23 PROCEDURE — 90662 IIV NO PRSV INCREASED AG IM: CPT | Performed by: NURSE PRACTITIONER

## 2024-10-23 PROCEDURE — 91320 SARSCV2 VAC 30MCG TRS-SUC IM: CPT | Performed by: NURSE PRACTITIONER

## 2024-10-23 PROCEDURE — 1159F MED LIST DOCD IN RCRD: CPT | Performed by: NURSE PRACTITIONER

## 2024-10-23 PROCEDURE — 99213 OFFICE O/P EST LOW 20 MIN: CPT | Performed by: NURSE PRACTITIONER

## 2024-10-23 PROCEDURE — G0008 ADMIN INFLUENZA VIRUS VAC: HCPCS | Performed by: NURSE PRACTITIONER

## 2024-10-23 PROCEDURE — 1125F AMNT PAIN NOTED PAIN PRSNT: CPT | Performed by: NURSE PRACTITIONER

## 2024-10-23 PROCEDURE — 90480 ADMN SARSCOV2 VAC 1/ONLY CMP: CPT | Performed by: NURSE PRACTITIONER

## 2024-10-23 PROCEDURE — 1160F RVW MEDS BY RX/DR IN RCRD: CPT | Performed by: NURSE PRACTITIONER

## 2024-10-23 RX ORDER — GABAPENTIN 100 MG/1
100 CAPSULE ORAL 3 TIMES DAILY PRN
Qty: 21 CAPSULE | Refills: 0 | Status: SHIPPED | OUTPATIENT
Start: 2024-10-23 | End: 2024-10-30

## 2024-10-23 NOTE — PROGRESS NOTES
CC:   Chief Complaint   Patient presents with    Herpes Zoster     Shingles follow up        History:  Chidi Aguilera is a 79 y.o. male who presents today for evaluation of the above problems.      HPI  Patient presents for f/u on post herpetic neuralgia. Reports pain is much improved since last visit- he is now out of gabapentin. States pain is more intermittent now and taking Tylenol PRN as well. Rash is drying up. No new lesions.   Weight is improved- up 5 lbs since last visit- states he still doesn't have an appetite but eating small snacks/meals a few times a day. Has developed constipation- states he is having small, hard stools- encouraged him to increase hydration and use OTC stool softeners.   He has scheduled appt in a few weeks and will do fasting labs at that time.     Allergies   Allergen Reactions    Tetracyclines & Related Rash     Past Medical History:   Diagnosis Date    Arthritis     Gout     Hypertension      Past Surgical History:   Procedure Laterality Date    HIP SURGERY Right      History reviewed. No pertinent family history.   reports that he has never smoked. He has never been exposed to tobacco smoke. He has never used smokeless tobacco. He reports current alcohol use. He reports that he does not use drugs.      Current Outpatient Medications:     allopurinol (ZYLOPRIM) 300 MG tablet, Take 1 tablet by mouth Daily., Disp: , Rfl:     ALPRAZolam (XANAX) 0.5 MG tablet, Take 1 tablet by mouth 2 (Two) Times a Day As Needed for Anxiety., Disp: , Rfl:     amLODIPine (NORVASC) 2.5 MG tablet, Take 1 tablet by mouth Daily., Disp: , Rfl:     aspirin (ASPIR) 81 MG EC tablet, Take 1 tablet by mouth Daily., Disp: , Rfl:     cetirizine (zyrTEC) 10 MG tablet, Take 1 tablet by mouth Daily., Disp: , Rfl:     gabapentin (NEURONTIN) 100 MG capsule, Take 1 capsule by mouth 3 (Three) Times a Day As Needed (postherpetic neuralgia) for up to 7 days., Disp: 21 capsule, Rfl: 0    lisinopril (PRINIVIL,ZESTRIL) 40 MG  "tablet, Take 1 tablet by mouth Daily., Disp: , Rfl:     multivitamin with minerals (therapeutic multivitamin-minerals) tablet tablet, Take 1 tablet by mouth Daily., Disp: , Rfl:     omeprazole (priLOSEC) 20 MG capsule, Take 1 capsule by mouth Daily., Disp: , Rfl:     OBJECTIVE:  /81 (BP Location: Left arm, Patient Position: Sitting, Cuff Size: Adult)   Pulse 74   Temp 98.6 °F (37 °C) (Temporal)   Ht 180.3 cm (71\")   Wt 90.5 kg (199 lb 9.6 oz)   SpO2 99%   BMI 27.84 kg/m²    Estimated body mass index is 27.84 kg/m² as calculated from the following:    Height as of this encounter: 180.3 cm (71\").    Weight as of this encounter: 90.5 kg (199 lb 9.6 oz).                  Physical Exam  Vitals reviewed.   Constitutional:       General: He is not in acute distress.     Appearance: Normal appearance.      Comments: Weak appearing   HENT:      Head: Normocephalic and atraumatic.   Cardiovascular:      Rate and Rhythm: Normal rate and regular rhythm.      Heart sounds: Normal heart sounds.   Pulmonary:      Effort: No respiratory distress.      Breath sounds: Normal breath sounds. No wheezing.   Abdominal:      General: Bowel sounds are normal.      Palpations: Abdomen is soft.      Tenderness: There is no abdominal tenderness.   Musculoskeletal:         General: Normal range of motion.   Skin:     General: Skin is warm and dry.      Comments: Faint shingles rash along left thoracic back, dry, few scabs, no vesicles   Neurological:      Mental Status: He is alert and oriented to person, place, and time.      Gait: Gait normal.   Psychiatric:         Mood and Affect: Mood normal.         Behavior: Behavior normal.         Thought Content: Thought content normal.              Assessment/Plan    Diagnoses and all orders for this visit:    1. HZV (herpes zoster virus) post herpetic neuralgia (Primary)  -     gabapentin (NEURONTIN) 100 MG capsule; Take 1 capsule by mouth 3 (Three) Times a Day As Needed (postherpetic " neuralgia) for up to 7 days.  Dispense: 21 capsule; Refill: 0    2. Constipation, unspecified constipation type    NAYE reviewed. Will extend the Gabapentin another week but advised to only take as needed for pain. Patient voices understanding. Much improved   Encouraged to get OTC docusate sodium and increase hydration. If still having problems with Bms- recommend OTC Miralax- handout given to patient.             An After Visit Summary was printed and given to the patient at discharge.  Return if symptoms worsen or fail to improve.

## 2024-11-06 ENCOUNTER — OFFICE VISIT (OUTPATIENT)
Dept: FAMILY MEDICINE CLINIC | Facility: CLINIC | Age: 79
End: 2024-11-06
Payer: MEDICARE

## 2024-11-06 ENCOUNTER — PATIENT ROUNDING (BHMG ONLY) (OUTPATIENT)
Dept: FAMILY MEDICINE CLINIC | Facility: CLINIC | Age: 79
End: 2024-11-06

## 2024-11-06 VITALS
WEIGHT: 197.6 LBS | DIASTOLIC BLOOD PRESSURE: 83 MMHG | TEMPERATURE: 98.4 F | BODY MASS INDEX: 27.66 KG/M2 | OXYGEN SATURATION: 98 % | SYSTOLIC BLOOD PRESSURE: 134 MMHG | HEIGHT: 71 IN | HEART RATE: 67 BPM

## 2024-11-06 DIAGNOSIS — Z11.59 ENCOUNTER FOR HEPATITIS C SCREENING TEST FOR LOW RISK PATIENT: ICD-10-CM

## 2024-11-06 DIAGNOSIS — E78.00 HYPERCHOLESTEROLEMIA: Chronic | ICD-10-CM

## 2024-11-06 DIAGNOSIS — F41.9 ANXIETY: Primary | Chronic | ICD-10-CM

## 2024-11-06 DIAGNOSIS — K21.9 GASTROESOPHAGEAL REFLUX DISEASE WITHOUT ESOPHAGITIS: Chronic | ICD-10-CM

## 2024-11-06 DIAGNOSIS — I10 PRIMARY HYPERTENSION: Chronic | ICD-10-CM

## 2024-11-06 DIAGNOSIS — R53.83 FATIGUE, UNSPECIFIED TYPE: ICD-10-CM

## 2024-11-06 DIAGNOSIS — N28.9 LOW KIDNEY FUNCTION: ICD-10-CM

## 2024-11-06 DIAGNOSIS — M10.9 GOUT, UNSPECIFIED CAUSE, UNSPECIFIED CHRONICITY, UNSPECIFIED SITE: Chronic | ICD-10-CM

## 2024-11-06 DIAGNOSIS — E55.9 VITAMIN D DEFICIENCY: ICD-10-CM

## 2024-11-06 PROBLEM — M16.11 ARTHRITIS OF RIGHT HIP: Status: ACTIVE | Noted: 2024-11-06

## 2024-11-06 PROCEDURE — 1160F RVW MEDS BY RX/DR IN RCRD: CPT | Performed by: NURSE PRACTITIONER

## 2024-11-06 PROCEDURE — 99214 OFFICE O/P EST MOD 30 MIN: CPT | Performed by: NURSE PRACTITIONER

## 2024-11-06 PROCEDURE — 3079F DIAST BP 80-89 MM HG: CPT | Performed by: NURSE PRACTITIONER

## 2024-11-06 PROCEDURE — 3075F SYST BP GE 130 - 139MM HG: CPT | Performed by: NURSE PRACTITIONER

## 2024-11-06 PROCEDURE — 1159F MED LIST DOCD IN RCRD: CPT | Performed by: NURSE PRACTITIONER

## 2024-11-06 PROCEDURE — 1126F AMNT PAIN NOTED NONE PRSNT: CPT | Performed by: NURSE PRACTITIONER

## 2024-11-06 RX ORDER — ALLOPURINOL 300 MG/1
300 TABLET ORAL DAILY
Qty: 90 TABLET | Refills: 3 | Status: SHIPPED | OUTPATIENT
Start: 2024-11-06

## 2024-11-06 RX ORDER — AMLODIPINE BESYLATE 2.5 MG/1
2.5 TABLET ORAL DAILY
Qty: 90 TABLET | Refills: 3 | Status: SHIPPED | OUTPATIENT
Start: 2024-11-06

## 2024-11-06 RX ORDER — LISINOPRIL 40 MG/1
40 TABLET ORAL DAILY
Qty: 90 TABLET | Refills: 3 | Status: SHIPPED | OUTPATIENT
Start: 2024-11-06

## 2024-11-06 RX ORDER — ALPRAZOLAM 0.5 MG
0.5 TABLET ORAL 3 TIMES DAILY PRN
Qty: 90 TABLET | Refills: 0 | Status: SHIPPED | OUTPATIENT
Start: 2024-11-06

## 2024-11-06 NOTE — ASSESSMENT & PLAN NOTE
William reviewed and appropriate.  Medication last filled on 9-.  Encourage patient to try taking a half a pill at night to start to wean down on medicine.

## 2024-11-06 NOTE — PROGRESS NOTES
November 6, 2024    Hello, may I speak with Chidi Aguilera?    My name is Radha      I am  with [facility]  [address], [address]  [place] [address] [phone].    Before we get started may I verify your date of birth? 1945    I am calling to officially welcome you to our practice and ask about your recent visit. Is this a good time to talk? yes     Tell me about your visit with us. What things went well?  Everything was good--I followed Haily here!       We're always looking for ways to make our patients' experiences even better. Do you have recommendations on ways we may improve?  none    Overall were you satisfied with your first visit to our practice? yes       I appreciate you taking the time to speak with me today. Is there anything else I can do for you? no      Thank you, and have a great day.

## 2024-11-06 NOTE — PROGRESS NOTES
CC:   Chief Complaint   Patient presents with    Follow-up     3 month follow up HTN, hyperlipidemia - non fasting        History:  Chidi Aguilera is a 79 y.o. male who presents today for evaluation of the above problems.      HPI  Patient presents for routine f/u.   Reports shingles pain is completely gone, no longer taking Gabapentin. Has a few sore spots to back when he touches them but rash has dried up.     Anxiety- Has hx of panic attacks that caused ER visits- Has been on Xanax 30-40 years now. He mainly takes it 2 times a day. Discussed him trying to only take 1/2 tablet at night to see if he can wean down on the medication some. No SE with medication.       10/16/2024    10:00 AM   CONTROLLED SUBSTANCE TRACKING   Last William 10/16/2024   Report Number reviewed through [facility]   Last UDS 10/16/2024   Last Controlled Substance Agreement 10/16/2024   WILLIAM reviewed, Last filled on 9/23/24    HTN- controlled on medication.  States he started taking Norvasc at night and has improved his daytime drowsiness.    Patient reports since having shingles he continues to have decrease in appetite and fatigue.  Reports he is trying to drink protein shakes twice a day to help.  Will do labs today to evaluate fatigue further.  He ate a muffin this morning.        Allergies   Allergen Reactions    Tetracyclines & Related Rash     Past Medical History:   Diagnosis Date    Arthritis     Gout     Hypertension      Past Surgical History:   Procedure Laterality Date    HIP SURGERY Right      History reviewed. No pertinent family history.   reports that he has never smoked. He has never been exposed to tobacco smoke. He has never used smokeless tobacco. He reports current alcohol use. He reports that he does not use drugs.      Current Outpatient Medications:     allopurinol (ZYLOPRIM) 300 MG tablet, Take 1 tablet by mouth Daily., Disp: 90 tablet, Rfl: 3    ALPRAZolam (XANAX) 0.5 MG tablet, Take 1 tablet by mouth 3 (Three) Times a  "Day As Needed for Anxiety., Disp: 90 tablet, Rfl: 0    amLODIPine (NORVASC) 2.5 MG tablet, Take 1 tablet by mouth Daily., Disp: 90 tablet, Rfl: 3    aspirin (ASPIR) 81 MG EC tablet, Take 1 tablet by mouth Daily., Disp: , Rfl:     cetirizine (zyrTEC) 10 MG tablet, Take 1 tablet by mouth Daily., Disp: , Rfl:     lisinopril (PRINIVIL,ZESTRIL) 40 MG tablet, Take 1 tablet by mouth Daily., Disp: 90 tablet, Rfl: 3    multivitamin with minerals (therapeutic multivitamin-minerals) tablet tablet, Take 1 tablet by mouth Daily., Disp: , Rfl:     omeprazole (priLOSEC) 20 MG capsule, Take 1 capsule by mouth Daily., Disp: 90 capsule, Rfl: 3    OBJECTIVE:  /83 (BP Location: Left arm, Patient Position: Sitting, Cuff Size: Adult)   Pulse 67   Temp 98.4 °F (36.9 °C) (Temporal)   Ht 180.3 cm (71\")   Wt 89.6 kg (197 lb 9.6 oz)   SpO2 98%   BMI 27.56 kg/m²    Estimated body mass index is 27.56 kg/m² as calculated from the following:    Height as of this encounter: 180.3 cm (71\").    Weight as of this encounter: 89.6 kg (197 lb 9.6 oz).                  Physical Exam  Vitals reviewed.   Constitutional:       General: He is not in acute distress.     Appearance: Normal appearance.   HENT:      Head: Normocephalic and atraumatic.   Cardiovascular:      Rate and Rhythm: Normal rate and regular rhythm.      Heart sounds: Normal heart sounds.   Pulmonary:      Effort: No respiratory distress.      Breath sounds: Normal breath sounds. No wheezing.   Abdominal:      General: Bowel sounds are normal.      Palpations: Abdomen is soft.   Musculoskeletal:      Cervical back: Normal range of motion.   Skin:     General: Skin is warm and dry.   Neurological:      Mental Status: He is alert and oriented to person, place, and time.      Gait: Gait abnormal (due to knee pain).   Psychiatric:         Mood and Affect: Mood normal.         Behavior: Behavior normal.         Thought Content: Thought content normal.            "   Assessment/Plan    Diagnoses and all orders for this visit:    1. Anxiety (Primary)  Assessment & Plan:  William reviewed and appropriate.  Medication last filled on 9-.  Encourage patient to try taking a half a pill at night to start to wean down on medicine.    Orders:  -     ALPRAZolam (XANAX) 0.5 MG tablet; Take 1 tablet by mouth 3 (Three) Times a Day As Needed for Anxiety.  Dispense: 90 tablet; Refill: 0    2. Primary hypertension  Assessment & Plan:  Hypertension is stable and controlled  Continue current treatment regimen.  Blood pressure will be reassessed in 3 months.    Orders:  -     CBC & Differential  -     Comprehensive Metabolic Panel  -     TSH  -     amLODIPine (NORVASC) 2.5 MG tablet; Take 1 tablet by mouth Daily.  Dispense: 90 tablet; Refill: 3  -     lisinopril (PRINIVIL,ZESTRIL) 40 MG tablet; Take 1 tablet by mouth Daily.  Dispense: 90 tablet; Refill: 3    3. Hypercholesterolemia  -     Comprehensive Metabolic Panel  -     Lipid Panel    4. Low kidney function  -     Comprehensive Metabolic Panel    5. Fatigue, unspecified type  -     CBC & Differential  -     Comprehensive Metabolic Panel  -     Vitamin B12  -     TSH    6. Vitamin D deficiency  -     Vitamin D,25-Hydroxy    7. Encounter for hepatitis C screening test for low risk patient  -     Hepatitis C Antibody    8. Gout, unspecified cause, unspecified chronicity, unspecified site  -     allopurinol (ZYLOPRIM) 300 MG tablet; Take 1 tablet by mouth Daily.  Dispense: 90 tablet; Refill: 3    9. Gastroesophageal reflux disease without esophagitis  -     omeprazole (priLOSEC) 20 MG capsule; Take 1 capsule by mouth Daily.  Dispense: 90 capsule; Refill: 3                An After Visit Summary was printed and given to the patient at discharge.  Return in about 3 months (around 2/6/2025) for Recheck.

## 2024-11-07 LAB
25(OH)D3+25(OH)D2 SERPL-MCNC: 40.3 NG/ML (ref 30–100)
ALBUMIN SERPL-MCNC: 4.4 G/DL (ref 3.5–5.2)
ALBUMIN/GLOB SERPL: 1.7 G/DL
ALP SERPL-CCNC: 92 U/L (ref 39–117)
ALT SERPL-CCNC: 69 U/L (ref 1–41)
AST SERPL-CCNC: 41 U/L (ref 1–40)
BASOPHILS # BLD AUTO: 0.06 10*3/MM3 (ref 0–0.2)
BASOPHILS NFR BLD AUTO: 0.9 % (ref 0–1.5)
BILIRUB SERPL-MCNC: 0.7 MG/DL (ref 0–1.2)
BUN SERPL-MCNC: 13 MG/DL (ref 8–23)
BUN/CREAT SERPL: 10.1 (ref 7–25)
CALCIUM SERPL-MCNC: 9.9 MG/DL (ref 8.6–10.5)
CHLORIDE SERPL-SCNC: 101 MMOL/L (ref 98–107)
CHOLEST SERPL-MCNC: 202 MG/DL (ref 0–200)
CO2 SERPL-SCNC: 26 MMOL/L (ref 22–29)
CREAT SERPL-MCNC: 1.29 MG/DL (ref 0.76–1.27)
EGFRCR SERPLBLD CKD-EPI 2021: 56.8 ML/MIN/1.73
EOSINOPHIL # BLD AUTO: 0.18 10*3/MM3 (ref 0–0.4)
EOSINOPHIL NFR BLD AUTO: 2.8 % (ref 0.3–6.2)
ERYTHROCYTE [DISTWIDTH] IN BLOOD BY AUTOMATED COUNT: 12.4 % (ref 12.3–15.4)
GLOBULIN SER CALC-MCNC: 2.6 GM/DL
GLUCOSE SERPL-MCNC: 98 MG/DL (ref 65–99)
HCT VFR BLD AUTO: 40.6 % (ref 37.5–51)
HCV IGG SERPL QL IA: NON REACTIVE
HDLC SERPL-MCNC: 54 MG/DL (ref 40–60)
HGB BLD-MCNC: 13.9 G/DL (ref 13–17.7)
IMM GRANULOCYTES # BLD AUTO: 0.01 10*3/MM3 (ref 0–0.05)
IMM GRANULOCYTES NFR BLD AUTO: 0.2 % (ref 0–0.5)
LDLC SERPL CALC-MCNC: 128 MG/DL (ref 0–100)
LYMPHOCYTES # BLD AUTO: 3.11 10*3/MM3 (ref 0.7–3.1)
LYMPHOCYTES NFR BLD AUTO: 48.4 % (ref 19.6–45.3)
MCH RBC QN AUTO: 32.5 PG (ref 26.6–33)
MCHC RBC AUTO-ENTMCNC: 34.2 G/DL (ref 31.5–35.7)
MCV RBC AUTO: 94.9 FL (ref 79–97)
MONOCYTES # BLD AUTO: 0.67 10*3/MM3 (ref 0.1–0.9)
MONOCYTES NFR BLD AUTO: 10.4 % (ref 5–12)
NEUTROPHILS # BLD AUTO: 2.39 10*3/MM3 (ref 1.7–7)
NEUTROPHILS NFR BLD AUTO: 37.3 % (ref 42.7–76)
PLATELET # BLD AUTO: 136 10*3/MM3 (ref 140–450)
POTASSIUM SERPL-SCNC: 4.2 MMOL/L (ref 3.5–5.2)
PROT SERPL-MCNC: 7 G/DL (ref 6–8.5)
RBC # BLD AUTO: 4.28 10*6/MM3 (ref 4.14–5.8)
SODIUM SERPL-SCNC: 138 MMOL/L (ref 136–145)
TRIGL SERPL-MCNC: 114 MG/DL (ref 0–150)
TSH SERPL DL<=0.005 MIU/L-ACNC: 2.58 UIU/ML (ref 0.27–4.2)
VIT B12 SERPL-MCNC: 517 PG/ML (ref 211–946)
VLDLC SERPL CALC-MCNC: 20 MG/DL (ref 5–40)
WBC # BLD AUTO: 6.42 10*3/MM3 (ref 3.4–10.8)

## 2025-01-06 DIAGNOSIS — F41.9 ANXIETY: Chronic | ICD-10-CM

## 2025-01-06 RX ORDER — ALPRAZOLAM 0.5 MG
0.5 TABLET ORAL 3 TIMES DAILY PRN
Qty: 90 TABLET | Refills: 0 | Status: SHIPPED | OUTPATIENT
Start: 2025-01-06

## 2025-01-06 NOTE — TELEPHONE ENCOUNTER
Rx Refill Note  Requested Prescriptions     Pending Prescriptions Disp Refills    ALPRAZolam (XANAX) 0.5 MG tablet [Pharmacy Med Name: ALPRAZolam 0.5 MG TABLET] 90 tablet      Sig: TAKE 1 TABLET BY MOUTH 3 TIMES A DAY AS NEEDED FOR ANXIETY      Last office visit with prescribing clinician: 11/6/2024   Last telemedicine visit with prescribing clinician: Visit date not found   Next office visit with prescribing clinician: 2/6/2025   CPE done 6/12/2024    Controlled med visit 11/06/2024  Contract and UDS 10/16/24                  {TIP  Is Refill Pharmacy correct?: yes    Would you like a call back once the refill request has been completed: [] Yes [] No    If the office needs to give you a call back, can they leave a voicemail: [] Yes [] No    Nancy Mcqueen MA  01/06/25, 11:27 CST

## 2025-02-06 ENCOUNTER — OFFICE VISIT (OUTPATIENT)
Dept: FAMILY MEDICINE CLINIC | Facility: CLINIC | Age: 80
End: 2025-02-06
Payer: MEDICARE

## 2025-02-06 VITALS
HEIGHT: 71 IN | WEIGHT: 199 LBS | DIASTOLIC BLOOD PRESSURE: 83 MMHG | OXYGEN SATURATION: 98 % | TEMPERATURE: 98.6 F | BODY MASS INDEX: 27.86 KG/M2 | SYSTOLIC BLOOD PRESSURE: 148 MMHG | HEART RATE: 67 BPM

## 2025-02-06 DIAGNOSIS — M17.11 ARTHRITIS OF RIGHT KNEE: ICD-10-CM

## 2025-02-06 DIAGNOSIS — I10 PRIMARY HYPERTENSION: Primary | Chronic | ICD-10-CM

## 2025-02-06 DIAGNOSIS — F41.9 ANXIETY: Chronic | ICD-10-CM

## 2025-02-06 PROCEDURE — 1159F MED LIST DOCD IN RCRD: CPT | Performed by: NURSE PRACTITIONER

## 2025-02-06 PROCEDURE — 1126F AMNT PAIN NOTED NONE PRSNT: CPT | Performed by: NURSE PRACTITIONER

## 2025-02-06 PROCEDURE — 3079F DIAST BP 80-89 MM HG: CPT | Performed by: NURSE PRACTITIONER

## 2025-02-06 PROCEDURE — 99214 OFFICE O/P EST MOD 30 MIN: CPT | Performed by: NURSE PRACTITIONER

## 2025-02-06 PROCEDURE — 1160F RVW MEDS BY RX/DR IN RCRD: CPT | Performed by: NURSE PRACTITIONER

## 2025-02-06 PROCEDURE — 3077F SYST BP >= 140 MM HG: CPT | Performed by: NURSE PRACTITIONER

## 2025-02-06 RX ORDER — ALPRAZOLAM 0.5 MG
0.5 TABLET ORAL 3 TIMES DAILY PRN
Qty: 90 TABLET | Refills: 0 | Status: SHIPPED | OUTPATIENT
Start: 2025-02-06

## 2025-02-06 NOTE — PROGRESS NOTES
CC:   Chief Complaint   Patient presents with    Follow-up     3 MONTHS-HTN, FASTING        History:  Chidi Aguilera is a 79 y.o. male who presents today for evaluation of the above problems.      HPI    Presents for 3-month follow-up.  Reports he was sick with COVID last week but feeling better now.  He took an at home COVID test and treated symptoms with over-the-counter medications.    Anxiety-patient takes Xanax twice a day to manage his anxiety.  Works well for patient with no side effects.  Encourage patient to try half a tablet to try to wean down when able.  Patient's anxiety/panic attacks have caused him to be hospitalized in the past.    HTN- has been running normal at home.  Taking medication as prescribed.    Patient has arthritis in right knee-has been using Voltaren gel and seems to be controlling symptoms overall.    Will do fasting labs at next appointment.      Allergies   Allergen Reactions    Tetracyclines & Related Rash     Past Medical History:   Diagnosis Date    Arthritis     Gout     Hypertension      Past Surgical History:   Procedure Laterality Date    HIP SURGERY Right      History reviewed. No pertinent family history.   reports that he has never smoked. He has never been exposed to tobacco smoke. He has never used smokeless tobacco. He reports current alcohol use. He reports that he does not use drugs.      Current Outpatient Medications:     allopurinol (ZYLOPRIM) 300 MG tablet, Take 1 tablet by mouth Daily., Disp: 90 tablet, Rfl: 3    ALPRAZolam (XANAX) 0.5 MG tablet, Take 1 tablet by mouth 3 (Three) Times a Day As Needed for Anxiety., Disp: 90 tablet, Rfl: 0    amLODIPine (NORVASC) 2.5 MG tablet, Take 1 tablet by mouth Daily., Disp: 90 tablet, Rfl: 3    aspirin (ASPIR) 81 MG EC tablet, Take 1 tablet by mouth Daily., Disp: , Rfl:     lisinopril (PRINIVIL,ZESTRIL) 40 MG tablet, Take 1 tablet by mouth Daily., Disp: 90 tablet, Rfl: 3    multivitamin with minerals (therapeutic  "multivitamin-minerals) tablet tablet, Take 1 tablet by mouth Daily., Disp: , Rfl:     omeprazole (priLOSEC) 20 MG capsule, Take 1 capsule by mouth Daily., Disp: 90 capsule, Rfl: 3    cetirizine (zyrTEC) 10 MG tablet, Take 1 tablet by mouth Daily. (Patient not taking: Reported on 2/6/2025), Disp: , Rfl:     OBJECTIVE:  /83 (BP Location: Left arm, Patient Position: Sitting, Cuff Size: Adult)   Pulse 67   Temp 98.6 °F (37 °C) (Temporal)   Ht 180.3 cm (71\")   Wt 90.3 kg (199 lb)   SpO2 98%   BMI 27.75 kg/m²    Estimated body mass index is 27.75 kg/m² as calculated from the following:    Height as of this encounter: 180.3 cm (71\").    Weight as of this encounter: 90.3 kg (199 lb).                  Physical Exam  Vitals reviewed.   Constitutional:       General: He is not in acute distress.     Appearance: Normal appearance.   HENT:      Head: Normocephalic and atraumatic.   Cardiovascular:      Rate and Rhythm: Normal rate and regular rhythm.   Pulmonary:      Effort: No respiratory distress.      Breath sounds: Normal breath sounds. No wheezing.   Musculoskeletal:      Right knee: Crepitus present. Tenderness present.   Skin:     General: Skin is warm and dry.   Neurological:      Mental Status: He is alert and oriented to person, place, and time.   Psychiatric:         Mood and Affect: Mood normal.         Behavior: Behavior normal.              Assessment/Plan    Diagnoses and all orders for this visit:    1. Primary hypertension (Primary)    2. Anxiety  Assessment & Plan:  William reviewed. Last filled on 1/6/2025.  No concerns today    Orders:  -     ALPRAZolam (XANAX) 0.5 MG tablet; Take 1 tablet by mouth 3 (Three) Times a Day As Needed for Anxiety.  Dispense: 90 tablet; Refill: 0    3. Arthritis of right knee    Continue medications as prescribed  Will do labs at next appt in 3 months            An After Visit Summary was printed and given to the patient at discharge.  Return in about 3 months (around " 5/6/2025) for Recheck and labs.

## 2025-03-24 DIAGNOSIS — F41.9 ANXIETY: Chronic | ICD-10-CM

## 2025-03-24 NOTE — TELEPHONE ENCOUNTER
Rx Refill Note  Requested Prescriptions     Pending Prescriptions Disp Refills    ALPRAZolam (XANAX) 0.5 MG tablet [Pharmacy Med Name: ALPRAZolam 0.5 MG TABLET] 90 tablet      Sig: TAKE 1 TABLET BY MOUTH 3 TIMES A DAY AS NEEDED FOR ANXIETY      Last office visit with prescribing clinician: 2/6/2025   Last telemedicine visit with prescribing clinician: Visit date not found   Next office visit with prescribing clinician: 5/8/2025   CPE done     Controlled med visit 2/06/2025  Contract and UDS 10/16/24                      Would you like a call back once the refill request has been completed: [] Yes [] No    If the office needs to give you a call back, can they leave a voicemail: [] Yes [] No    Nancy Mcqueen MA  03/24/25, 16:09 CDT

## 2025-03-25 RX ORDER — ALPRAZOLAM 0.5 MG
0.5 TABLET ORAL 3 TIMES DAILY PRN
Qty: 90 TABLET | Refills: 0 | Status: SHIPPED | OUTPATIENT
Start: 2025-03-25

## 2025-05-08 ENCOUNTER — OFFICE VISIT (OUTPATIENT)
Dept: FAMILY MEDICINE CLINIC | Facility: CLINIC | Age: 80
End: 2025-05-08
Payer: MEDICARE

## 2025-05-08 VITALS
BODY MASS INDEX: 28.14 KG/M2 | HEART RATE: 78 BPM | WEIGHT: 201 LBS | SYSTOLIC BLOOD PRESSURE: 131 MMHG | DIASTOLIC BLOOD PRESSURE: 84 MMHG | TEMPERATURE: 98 F | HEIGHT: 71 IN | OXYGEN SATURATION: 99 %

## 2025-05-08 DIAGNOSIS — E78.00 HYPERCHOLESTEROLEMIA: ICD-10-CM

## 2025-05-08 DIAGNOSIS — I10 PRIMARY HYPERTENSION: Primary | ICD-10-CM

## 2025-05-08 DIAGNOSIS — M10.9 GOUT, UNSPECIFIED CAUSE, UNSPECIFIED CHRONICITY, UNSPECIFIED SITE: ICD-10-CM

## 2025-05-08 DIAGNOSIS — M17.11 ARTHRITIS OF RIGHT KNEE: ICD-10-CM

## 2025-05-08 DIAGNOSIS — F41.9 ANXIETY: Chronic | ICD-10-CM

## 2025-05-08 PROCEDURE — 1159F MED LIST DOCD IN RCRD: CPT | Performed by: NURSE PRACTITIONER

## 2025-05-08 PROCEDURE — 99214 OFFICE O/P EST MOD 30 MIN: CPT | Performed by: NURSE PRACTITIONER

## 2025-05-08 PROCEDURE — 3079F DIAST BP 80-89 MM HG: CPT | Performed by: NURSE PRACTITIONER

## 2025-05-08 PROCEDURE — 3075F SYST BP GE 130 - 139MM HG: CPT | Performed by: NURSE PRACTITIONER

## 2025-05-08 PROCEDURE — 1126F AMNT PAIN NOTED NONE PRSNT: CPT | Performed by: NURSE PRACTITIONER

## 2025-05-08 PROCEDURE — 1160F RVW MEDS BY RX/DR IN RCRD: CPT | Performed by: NURSE PRACTITIONER

## 2025-05-08 RX ORDER — DICLOFENAC SODIUM 75 MG/1
75 TABLET, DELAYED RELEASE ORAL 2 TIMES DAILY
Qty: 180 TABLET | Refills: 0 | Status: SHIPPED | OUTPATIENT
Start: 2025-05-08

## 2025-05-08 RX ORDER — ALPRAZOLAM 0.5 MG
0.5 TABLET ORAL 3 TIMES DAILY PRN
Qty: 90 TABLET | Refills: 1 | Status: SHIPPED | OUTPATIENT
Start: 2025-05-08
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
## 2025-05-09 LAB
ALBUMIN SERPL-MCNC: 4.5 G/DL (ref 3.5–5.2)
ALBUMIN/GLOB SERPL: 1.6 G/DL
ALP SERPL-CCNC: 81 U/L (ref 39–117)
ALT SERPL-CCNC: 21 U/L (ref 1–41)
AST SERPL-CCNC: 23 U/L (ref 1–40)
BASOPHILS # BLD AUTO: 0.07 10*3/MM3 (ref 0–0.2)
BASOPHILS NFR BLD AUTO: 1 % (ref 0–1.5)
BILIRUB SERPL-MCNC: 1 MG/DL (ref 0–1.2)
BUN SERPL-MCNC: 7 MG/DL (ref 8–23)
BUN/CREAT SERPL: 6 (ref 7–25)
CALCIUM SERPL-MCNC: 9.8 MG/DL (ref 8.6–10.5)
CHLORIDE SERPL-SCNC: 99 MMOL/L (ref 98–107)
CHOLEST SERPL-MCNC: 181 MG/DL (ref 0–200)
CO2 SERPL-SCNC: 26.9 MMOL/L (ref 22–29)
CREAT SERPL-MCNC: 1.16 MG/DL (ref 0.76–1.27)
EGFRCR SERPLBLD CKD-EPI 2021: 64.5 ML/MIN/1.73
EOSINOPHIL # BLD AUTO: 0.05 10*3/MM3 (ref 0–0.4)
EOSINOPHIL NFR BLD AUTO: 0.7 % (ref 0.3–6.2)
ERYTHROCYTE [DISTWIDTH] IN BLOOD BY AUTOMATED COUNT: 12.9 % (ref 12.3–15.4)
GLOBULIN SER CALC-MCNC: 2.8 GM/DL
GLUCOSE SERPL-MCNC: 99 MG/DL (ref 65–99)
HCT VFR BLD AUTO: 46.1 % (ref 37.5–51)
HDLC SERPL-MCNC: 73 MG/DL (ref 40–60)
HGB BLD-MCNC: 15.7 G/DL (ref 13–17.7)
IMM GRANULOCYTES # BLD AUTO: 0.02 10*3/MM3 (ref 0–0.05)
IMM GRANULOCYTES NFR BLD AUTO: 0.3 % (ref 0–0.5)
LDLC SERPL CALC-MCNC: 94 MG/DL (ref 0–100)
LYMPHOCYTES # BLD AUTO: 1.83 10*3/MM3 (ref 0.7–3.1)
LYMPHOCYTES NFR BLD AUTO: 26.1 % (ref 19.6–45.3)
MCH RBC QN AUTO: 31.8 PG (ref 26.6–33)
MCHC RBC AUTO-ENTMCNC: 34.1 G/DL (ref 31.5–35.7)
MCV RBC AUTO: 93.5 FL (ref 79–97)
MONOCYTES # BLD AUTO: 0.74 10*3/MM3 (ref 0.1–0.9)
MONOCYTES NFR BLD AUTO: 10.5 % (ref 5–12)
NEUTROPHILS # BLD AUTO: 4.31 10*3/MM3 (ref 1.7–7)
NEUTROPHILS NFR BLD AUTO: 61.4 % (ref 42.7–76)
PLATELET # BLD AUTO: 139 10*3/MM3 (ref 140–450)
POTASSIUM SERPL-SCNC: 4.6 MMOL/L (ref 3.5–5.2)
PROT SERPL-MCNC: 7.3 G/DL (ref 6–8.5)
RBC # BLD AUTO: 4.93 10*6/MM3 (ref 4.14–5.8)
SODIUM SERPL-SCNC: 137 MMOL/L (ref 136–145)
TRIGL SERPL-MCNC: 73 MG/DL (ref 0–150)
URATE SERPL-MCNC: 4.2 MG/DL (ref 3.4–7)
VLDLC SERPL CALC-MCNC: 14 MG/DL (ref 5–40)
WBC # BLD AUTO: 7.02 10*3/MM3 (ref 3.4–10.8)

## 2025-08-07 ENCOUNTER — OFFICE VISIT (OUTPATIENT)
Dept: FAMILY MEDICINE CLINIC | Facility: CLINIC | Age: 80
End: 2025-08-07
Payer: MEDICARE

## 2025-08-07 VITALS
HEART RATE: 74 BPM | RESPIRATION RATE: 18 BRPM | BODY MASS INDEX: 28.42 KG/M2 | WEIGHT: 203 LBS | SYSTOLIC BLOOD PRESSURE: 130 MMHG | OXYGEN SATURATION: 96 % | HEIGHT: 71 IN | DIASTOLIC BLOOD PRESSURE: 90 MMHG | TEMPERATURE: 98.7 F

## 2025-08-07 DIAGNOSIS — K21.9 GASTROESOPHAGEAL REFLUX DISEASE WITHOUT ESOPHAGITIS: Chronic | ICD-10-CM

## 2025-08-07 DIAGNOSIS — E78.00 PURE HYPERCHOLESTEROLEMIA: ICD-10-CM

## 2025-08-07 DIAGNOSIS — M17.11 ARTHRITIS OF RIGHT KNEE: ICD-10-CM

## 2025-08-07 DIAGNOSIS — M10.9 GOUT, UNSPECIFIED CAUSE, UNSPECIFIED CHRONICITY, UNSPECIFIED SITE: Chronic | ICD-10-CM

## 2025-08-07 DIAGNOSIS — R53.83 FATIGUE, UNSPECIFIED TYPE: ICD-10-CM

## 2025-08-07 DIAGNOSIS — I10 PRIMARY HYPERTENSION: Chronic | ICD-10-CM

## 2025-08-07 DIAGNOSIS — F41.9 ANXIETY: Chronic | ICD-10-CM

## 2025-08-07 DIAGNOSIS — Z00.00 MEDICARE ANNUAL WELLNESS VISIT, SUBSEQUENT: Primary | ICD-10-CM

## 2025-08-07 DIAGNOSIS — J30.9 ALLERGIC RHINITIS, UNSPECIFIED SEASONALITY, UNSPECIFIED TRIGGER: ICD-10-CM

## 2025-08-07 DIAGNOSIS — Z12.5 SCREENING FOR PROSTATE CANCER: ICD-10-CM

## 2025-08-07 RX ORDER — ALPRAZOLAM 0.5 MG
0.5 TABLET ORAL 3 TIMES DAILY PRN
Qty: 90 TABLET | Refills: 1 | Status: SHIPPED | OUTPATIENT
Start: 2025-08-07

## 2025-08-07 RX ORDER — AZELASTINE 1 MG/ML
2 SPRAY, METERED NASAL
Qty: 1 EACH | Refills: 3 | Status: SHIPPED | OUTPATIENT
Start: 2025-08-07

## 2025-08-07 RX ORDER — ALLOPURINOL 300 MG/1
300 TABLET ORAL DAILY
Qty: 90 TABLET | Refills: 3 | Status: SHIPPED | OUTPATIENT
Start: 2025-08-07 | End: 2025-08-07

## 2025-08-07 RX ORDER — AMLODIPINE BESYLATE 2.5 MG/1
2.5 TABLET ORAL DAILY
Qty: 90 TABLET | Refills: 3 | Status: SHIPPED | OUTPATIENT
Start: 2025-08-07

## 2025-08-07 RX ORDER — LISINOPRIL 40 MG/1
40 TABLET ORAL DAILY
Qty: 90 TABLET | Refills: 3 | Status: SHIPPED | OUTPATIENT
Start: 2025-08-07

## 2025-08-07 RX ORDER — DICLOFENAC SODIUM 75 MG/1
75 TABLET, DELAYED RELEASE ORAL 2 TIMES DAILY
Qty: 180 TABLET | Refills: 0 | Status: SHIPPED | OUTPATIENT
Start: 2025-08-07

## 2025-08-07 RX ORDER — OMEPRAZOLE 20 MG/1
20 CAPSULE, DELAYED RELEASE ORAL DAILY
Qty: 90 CAPSULE | Refills: 3 | Status: SHIPPED | OUTPATIENT
Start: 2025-08-07

## 2025-08-08 LAB
ALBUMIN SERPL-MCNC: 4.5 G/DL (ref 3.5–5.2)
ALBUMIN/GLOB SERPL: 1.6 G/DL
ALP SERPL-CCNC: 79 U/L (ref 39–117)
ALT SERPL-CCNC: 21 U/L (ref 1–41)
AST SERPL-CCNC: 23 U/L (ref 1–40)
BASOPHILS # BLD AUTO: 0.08 10*3/MM3 (ref 0–0.2)
BASOPHILS NFR BLD AUTO: 0.8 % (ref 0–1.5)
BILIRUB SERPL-MCNC: 1.4 MG/DL (ref 0–1.2)
BUN SERPL-MCNC: 11 MG/DL (ref 8–23)
BUN/CREAT SERPL: 8.3 (ref 7–25)
CALCIUM SERPL-MCNC: 9.6 MG/DL (ref 8.6–10.5)
CHLORIDE SERPL-SCNC: 99 MMOL/L (ref 98–107)
CHOLEST SERPL-MCNC: 183 MG/DL (ref 0–200)
CO2 SERPL-SCNC: 24.7 MMOL/L (ref 22–29)
CREAT SERPL-MCNC: 1.32 MG/DL (ref 0.76–1.27)
EGFRCR SERPLBLD CKD-EPI 2021: 54.9 ML/MIN/1.73
EOSINOPHIL # BLD AUTO: 0.28 10*3/MM3 (ref 0–0.4)
EOSINOPHIL NFR BLD AUTO: 2.8 % (ref 0.3–6.2)
ERYTHROCYTE [DISTWIDTH] IN BLOOD BY AUTOMATED COUNT: 15 % (ref 12.3–15.4)
GLOBULIN SER CALC-MCNC: 2.8 GM/DL
GLUCOSE SERPL-MCNC: 110 MG/DL (ref 65–99)
HCT VFR BLD AUTO: 46.7 % (ref 37.5–51)
HDLC SERPL-MCNC: 58 MG/DL (ref 40–60)
HGB BLD-MCNC: 15.2 G/DL (ref 13–17.7)
IMM GRANULOCYTES # BLD AUTO: 0.02 10*3/MM3 (ref 0–0.05)
IMM GRANULOCYTES NFR BLD AUTO: 0.2 % (ref 0–0.5)
LDLC SERPL CALC-MCNC: 111 MG/DL (ref 0–100)
LYMPHOCYTES # BLD AUTO: 1.81 10*3/MM3 (ref 0.7–3.1)
LYMPHOCYTES NFR BLD AUTO: 18.3 % (ref 19.6–45.3)
MCH RBC QN AUTO: 33 PG (ref 26.6–33)
MCHC RBC AUTO-ENTMCNC: 32.5 G/DL (ref 31.5–35.7)
MCV RBC AUTO: 101.3 FL (ref 79–97)
MONOCYTES # BLD AUTO: 0.7 10*3/MM3 (ref 0.1–0.9)
MONOCYTES NFR BLD AUTO: 7.1 % (ref 5–12)
NEUTROPHILS # BLD AUTO: 7 10*3/MM3 (ref 1.7–7)
NEUTROPHILS NFR BLD AUTO: 70.8 % (ref 42.7–76)
NRBC BLD AUTO-RTO: 0 /100 WBC (ref 0–0.2)
PLATELET # BLD AUTO: 172 10*3/MM3 (ref 140–450)
POTASSIUM SERPL-SCNC: 4.5 MMOL/L (ref 3.5–5.2)
PROT SERPL-MCNC: 7.3 G/DL (ref 6–8.5)
PSA SERPL-MCNC: 1.14 NG/ML (ref 0–4)
RBC # BLD AUTO: 4.61 10*6/MM3 (ref 4.14–5.8)
SODIUM SERPL-SCNC: 136 MMOL/L (ref 136–145)
TRIGL SERPL-MCNC: 76 MG/DL (ref 0–150)
TSH SERPL DL<=0.005 MIU/L-ACNC: 2.06 UIU/ML (ref 0.27–4.2)
VLDLC SERPL CALC-MCNC: 14 MG/DL (ref 5–40)
WBC # BLD AUTO: 9.89 10*3/MM3 (ref 3.4–10.8)